# Patient Record
Sex: MALE | Race: WHITE | NOT HISPANIC OR LATINO | Employment: FULL TIME | URBAN - METROPOLITAN AREA
[De-identification: names, ages, dates, MRNs, and addresses within clinical notes are randomized per-mention and may not be internally consistent; named-entity substitution may affect disease eponyms.]

---

## 2018-05-09 ENCOUNTER — OFFICE VISIT (OUTPATIENT)
Dept: FAMILY MEDICINE | Facility: CLINIC | Age: 43
End: 2018-05-09
Payer: COMMERCIAL

## 2018-05-09 VITALS
TEMPERATURE: 97.2 F | WEIGHT: 193 LBS | HEIGHT: 68 IN | BODY MASS INDEX: 29.25 KG/M2 | HEART RATE: 73 BPM | SYSTOLIC BLOOD PRESSURE: 110 MMHG | DIASTOLIC BLOOD PRESSURE: 70 MMHG

## 2018-05-09 DIAGNOSIS — M54.50 ACUTE MIDLINE LOW BACK PAIN WITHOUT SCIATICA: Primary | ICD-10-CM

## 2018-05-09 PROCEDURE — 99213 OFFICE O/P EST LOW 20 MIN: CPT | Performed by: INTERNAL MEDICINE

## 2018-05-09 RX ORDER — CYCLOBENZAPRINE HCL 10 MG
10 TABLET ORAL 3 TIMES DAILY PRN
Qty: 60 TABLET | Refills: 1 | Status: SHIPPED | OUTPATIENT
Start: 2018-05-09 | End: 2018-06-12

## 2018-05-09 NOTE — MR AVS SNAPSHOT
After Visit Summary   5/9/2018    Derian English    MRN: [id number]           Patient Information     Date Of Birth          1975        Visit Information        Provider Department      5/9/2018 12:00 PM Dania Danielle [place] [facility] [place]'s Diagnoses     Acute midline low back pain without sciatica    -  1      Care Instructions      Back Pain (Acute or Chronic)    Back pain is one of the most common problems. The good news is that most people feel better in 1 to 2 weeks, and most of the rest in 1 to 2 months. Most people can remain active.  People experience and describe pain differently; not everyone is the same.    The pain can be sharp, stabbing, shooting, aching, cramping or burning.    Movement, standing, bending, lifting, sitting, or walking may worsen pain.    It can be localized to one spot or area, or it can be more generalized.    It can spread or radiate upwards, to the front, or go down your arms or legs (sciatica).    It can cause muscle spasm.  Most of the time, mechanical problems with the muscles or spine cause the pain. Mechanical problems are usually caused by an injury to the muscles or ligaments. While illness can cause back pain, it is usually not caused by a serious illness. Mechanical problems include:     Physical activity such as sports, exercise, work, or normal activity    Overexertion, lifting, pushing, pulling incorrectly or too aggressively    Sudden twisting, bending, or stretching from an accident, or accidental movement    Poor posture    Stretching or moving wrong, without noticing pain at the time    Poor coordination, lack of regular exercise (check with your doctor about this)    Spinal disc disease or arthritis    Stress  Pain can also be related to pregnancy, or illness like appendicitis, bladder or kidney infections, pelvic infections, and many other things.  Acute back pain usually gets better in 1 to 2 weeks. Back pain  related to disk disease, arthritis in the spinal joints or spinal stenosis (narrowing of the spinal canal) can become chronic and last for months or years.  Unless you had a physical injury (for example, a car accident or fall) X-rays are usually not needed for the initial evaluation of back pain. If pain continues and does not respond to medical treatment, X-rays and other tests may be needed.  Home care  Try these home care recommendations:    When in bed, try to find a position of comfort. A firm mattress is best. Try lying flat on your back with pillows under your knees. You can also try lying on your side with your knees bent up towards your chest and a pillow between your knees.    At first, do not try to stretch out the sore spots. If there is a strain, it is not like the good soreness you get after exercising without an injury. In this case, stretching may make it worse.    Avoid prolong sitting, long car rides, or travel. This puts more stress on the lower back than standing or walking.    During the first 24 to 72 hours after an acute injury or flare up of chronic back pain, apply an ice pack to the painful area for 20 minutes and then remove it for 20 minutes. Do this over a period of 60 to 90 minutes or several times a day. This will reduce swelling and pain. Wrap the ice pack in a thin towel or plastic to protect your skin.    You can start with ice, then switch to heat. Heat (hot shower, hot bath, or heating pad) reduces pain and works well for muscle spasms. Heat can be applied to the painful area for 20 minutes then remove it for 20 minutes. Do this over a period of 60 to 90 minutes or several times a day. Do not sleep on a heating pad. It can lead to skin burns or tissue damage.    You can alternate ice and heat therapy. Talk with your doctor about the best treatment for your back pain.    Therapeutic massage can help relax the back muscles without stretching them.    Be aware of safe lifting  methods and do not lift anything without stretching first.  Medicines  Talk to your doctor before using medicine, especially if you have other medical problems or are taking other medicines.    You may use over-the-counter medicine as directed on the bottle to control pain, unless another pain medicine was prescribed. If you have chronic conditions like diabetes, liver or kidney disease, stomach ulcers, or gastrointestinal bleeding, or are taking blood thinners, talk to your doctor before taking any medicine.    Be careful if you are given a prescription medicines, narcotics, or medicine for muscle spasms. They can cause drowsiness, affect your coordination, reflexes, and judgement. Do not drive or operate heavy machinery.  Follow-up care  Follow up with your healthcare provider, or as advised.   A radiologist will review any X-rays that were taken. Your provide will notify you of any new findings that may affect your care.  Call 911  Call emergency services if any of the following occur:    Trouble breathing    Confusion    Very drowsy or trouble awakening    Fainting or loss of consciousness    Rapid or very slow heart rate    Loss of bowel or bladder control  When to seek medical advice  Call your healthcare provider right away if any of these occur:     Pain becomes worse or spreads to your legs    Weakness or numbness in one or both legs    Numbness in the groin or genital area  Date Last Reviewed: 7/1/2016 2000-2017 [employer]. [address] [place] [address]. All rights reserved. This information is not intended as a substitute for professional medical care. Always follow your healthcare professional's instructions.                Follow-ups after your visit        Follow-up notes from your care team     Return in about 1 year (around 5/9/2019), or if symptoms worsen or fail to improve, for Physical Exam.      Who to contact     If you have questions or need follow up information  "about today's clinic visit or your schedule please contact [facility] directly at [phone].  Normal or non-critical lab and imaging results will be communicated to you by [employer], letter or phone within 4 business days after the clinic has received the results. If you do not hear from us within 7 days, please contact the clinic through [employer] or phone. If you have a critical or abnormal lab result, we will notify you by phone as soon as possible.  Submit refill requests through [email or web address] or call your pharmacy and they will forward the refill request to us. Please allow 3 business days for your refill to be completed.          Additional Information About Your Visit        [employer] Information     [email or web address] lets you send messages to your doctor, view your test results, renew your prescriptions, schedule appointments and more. To sign up, go to [email or web address] . Click on \"Log in\" on the left side of the screen, which will take you to the Welcome page. Then click on \"Sign up Now\" on the right side of the page.     You will be asked to enter the access code listed below, as well as some personal information. Please follow the directions to create your username and password.     Your access code is: [email or web address]  Expires: 2018 12:17 PM     Your access code will  in 90 days. If you need help or a new code, please call your [place] clinic or [phone].        Care EveryWhere ID     This is your Care EveryWhere ID. This could be used by other organizations to access your [place] medical records  [id number]        Your Vitals Were     Pulse Temperature Height BMI (Body Mass Index)          73 97.2  F (36.2  C) (Tympanic) 5' 8\" (1.727 m) 29.35 kg/m2         Blood Pressure from Last 3 Encounters:   18 110/70   16 112/68    Weight from Last 3 Encounters:   18 193 lb (87.5 kg)   16 187 lb (84.8 kg)              Today, you had the following     No orders found " for display         Today's Medication Changes          These changes are accurate as of 5/9/18 12:17 PM.  If you have any questions, ask your nurse or doctor.               Start taking these medicines.        Dose/Directions    cyclobenzaprine 10 MG tablet   Commonly known as:  FLEXERIL   Used for:  Acute midline low back pain without sciatica   Started by:  Dania Danielle MD        Dose:  10 mg   Take 1 tablet (10 mg) by mouth 3 times daily as needed for muscle spasms   Quantity:  60 tablet   Refills:  1            Where to get your medicines      These medications were sent to [id number] [place] [place] [place] - [address]  [address], [facility] [address]     Phone:  [phone]     cyclobenzaprine 10 MG tablet                Primary Care Provider Fax #    Provider Not In System [phone]                Equal Access to Services     SANA JAMES : Hadii didier aguiar hadasho Sospencer, waaxda luqadaha, qaybta kaalmada adeseverinoyada, kristopher lobo . So [facility] [phone].    ATENCIÓN: Si habla español, tiene a montero disposición servicios gratuitos de asistencia lingüística. Rl al [phone].    We comply with applicable federal civil rights laws and [place] laws. We do not discriminate on the basis of race, color, national origin, age, disability, sex, sexual orientation, or gender identity.            Thank you!     [facility] you [facility]  [facility] your [facility]. Our goal is always to provide you with excellent care. Hearing back from our patients is one way we can continue to improve our services. Please take a few minutes to complete the written survey that you may receive in the mail after your visit with us. Thank you!             Your Updated Medication List - Protect others around you: Learn how to safely use, store and throw away your medicines at www.disposemymeds.org.          This list is accurate as of 5/9/18 12:17 PM.  Always  use your most recent med list.                   Brand Name Dispense Instructions for use Diagnosis    cyclobenzaprine 10 MG tablet    FLEXERIL    60 tablet    Take 1 tablet (10 mg) by mouth 3 times daily as needed for muscle spasms    Acute midline low back pain without sciatica       IBUPROFEN PO      Take 800 mg by mouth every 6 hours as needed for moderate pain

## 2018-05-09 NOTE — PROGRESS NOTES
"  SUBJECTIVE:   Derian English is a 43 year old male who presents to clinic today for the following health issues:      Back Pain       Duration: yesterday am         Specific cause: bending over brushing his teth     Description:   Location of pain: low back bilateral  Character of pain: cramping  Pain radiation:radiates around to the front of the waist   New numbness or weakness in legs, not attributed to pain:  no     Intensity: Currently 2/10 but standing from sitting is worse     History:   Pain interferes with job: YES  History of back problems: no prior back problems  Any previous MRI or X-rays: None  Sees a specialist for back pain:  No  Therapies tried without relief: heat and NSAIDs    Alleviating factors:   Improved by: walking around       Precipitating factors:  Worsened by: sitting still or lying down and then trying to get back up     Functional and Psychosocial Screen (Jose STarT Back):          Derian is here accompanied by his wife for acute low back pain.  It started yesterday morning while he was leaning over the skin brushing his teeth.  He had acute onset of lower back pain, felt frozen in place.  He did remove the stump from his yard in the past week.  He has been taking ibuprofen 800 mg, trying some gentle stretching and taking NEC.  The pain is much better today.  He has not had back pain issues in the past.  He denies any radiating symptoms.  He works as a , out walking on the floor quite a bit.          Reviewed and updated as needed this visit by clinical staff  Tobacco  Allergies  Meds  Soc Hx      Reviewed and updated as needed this visit by Provider         ROS:  Musculoskeletal, neuro reviewed,  otherwise negative unless noted above.       OBJECTIVE:     /70 (BP Location: Left arm, Patient Position: Chair, Cuff Size: Adult Large)  Pulse 73  Temp 97.2  F (36.2  C) (Tympanic)  Ht 5' 8\" (1.727 m)  Wt 193 lb (87.5 kg)  BMI 29.35 kg/m2  Body mass index " is 29.35 kg/(m^2).    Gen: pleasant, well appearing man, moving stiffly, no distress  MSK: no spinal, SI joint, or lumbar paraspinal muscle tenderness  Neuro: full strength in LE's b/l. 2+ patellar reflexes b/l. Sensation intact to LT. Normal gait.       Diagnostic Test Results:  none     ASSESSMENT/PLAN:         1. Acute midline low back pain without sciatica  Acute back spasm.  Recommended ATC NSAIDs for a few days, muscle relaxant as needed, ice and/or heat, gentle activity and ROM.  Consider PT if not making good progress.   - cyclobenzaprine (FLEXERIL) 10 MG tablet; Take 1 tablet (10 mg) by mouth 3 times daily as needed for muscle spasms  Dispense: 60 tablet; Refill: 1    F/U as needed for persistent or worsening symptoms.       Dania Danielle MD  [facility] [place]

## 2018-05-09 NOTE — PATIENT INSTRUCTIONS

## 2018-06-12 ENCOUNTER — OFFICE VISIT (OUTPATIENT)
Dept: FAMILY MEDICINE | Facility: CLINIC | Age: 43
End: 2018-06-12
Payer: COMMERCIAL

## 2018-06-12 VITALS
OXYGEN SATURATION: 98 % | SYSTOLIC BLOOD PRESSURE: 123 MMHG | WEIGHT: 190.3 LBS | HEIGHT: 68 IN | DIASTOLIC BLOOD PRESSURE: 88 MMHG | HEART RATE: 69 BPM | BODY MASS INDEX: 28.84 KG/M2

## 2018-06-12 DIAGNOSIS — Z30.09 ENCOUNTER FOR VASECTOMY ASSESSMENT: Primary | ICD-10-CM

## 2018-06-12 DIAGNOSIS — H93.11 TINNITUS, RIGHT: ICD-10-CM

## 2018-06-12 PROCEDURE — 99214 OFFICE O/P EST MOD 30 MIN: CPT | Performed by: FAMILY MEDICINE

## 2018-06-12 RX ORDER — INFLUENZA VIRUS VACCINE 15; 15; 15; 15 UG/.5ML; UG/.5ML; UG/.5ML; UG/.5ML
SUSPENSION INTRAMUSCULAR
COMMUNITY
Start: 2018-01-16 | End: 2018-06-12

## 2018-06-12 RX ORDER — AMOXICILLIN 875 MG
TABLET ORAL
COMMUNITY
Start: 2018-05-30 | End: 2018-06-12

## 2018-06-12 RX ORDER — NEOMYCIN SULFATE, POLYMYXIN B SULFATE, HYDROCORTISONE 3.5; 10000; 1 MG/ML; [USP'U]/ML; MG/ML
SOLUTION/ DROPS AURICULAR (OTIC)
COMMUNITY
Start: 2018-05-30 | End: 2018-06-12

## 2018-06-12 NOTE — MR AVS SNAPSHOT
After Visit Summary   6/12/2018    Derian English    MRN: [id number]           Patient Information     Date Of Birth          1975        Visit Information        Provider Department      6/12/2018 9:40 AM Anton Randall Jr., MD [facility]        Today's Diagnoses     Encounter for vasectomy assessment    -  1    Tinnitus, right          Care Instructions      Understanding Vasectomy  Vasectomy is a simple, safe procedure that makes a man sterile (unable to father a child). It is the most effective birth control method for men.  Your reproductive system  For pregnancy to occur, a man s sperm (male reproductive cells) must join with a woman s egg. To understand how a vasectomy works, you need to know how sperm are produced, stored, and released by the body:    The urethra is the tube in the center of the penis. It transports both urine and semen. When you have an orgasm, semen is ejaculated out of the urethra.    The seminal vesicles and the prostate gland secrete fluids called semen. This sticky, white fluid helps nourish sperm and carry them along.    The epididymis is a coiled tube that holds the sperm while they mature.    The scrotum is a pouch of skin that contains the testes.    The testes are glands that produce sperm and male hormones.    The vas deferens are tubes that carry the sperm from the epididymis to the penis.    Sperm (shown magnified) carry genetic material.     How a vasectomy works  During the procedure, the 2 vas deferens are cut and sealed off. This prevents sperm from traveling from the testes to the penis. It is the only change in your reproductive system. The testes still produce sperm. But since the sperm have nowhere to go, they die and are absorbed by your body. Only a very small amount of semen is made up of sperm. So after a vasectomy, your semen won t look or feel any different.  Keep in mind  After a vasectomy, some active sperm still remain in  the reproductive system. It will take about 3 months and numerous ejaculations before the semen is completely free of sperm. Until then, you ll need to use another form of birth control.   Date Last Reviewed: 1/1/2017 2000-2017 [employer]. [address] [place] [id number]. All rights reserved. This information is not intended as a substitute for professional medical care. Always follow your healthcare professional's instructions.        Having a Vasectomy: Before, During, and After the Procedure     The cut ends of the vas may be tied, closed with a clip, or sealed by heat (cauterized).   Vasectomy is an outpatient procedure. This means you can go home the same day. It can be done in a doctor s office, clinic, or hospital. Your doctor will talk with you about how to get ready for surgery. He or she will also discuss the possible risks and complications with you. After the procedure, follow your doctor s advice for recovery.  Getting ready for surgery  Your doctor will talk with you about getting ready for surgery. You may be asked to do the following:    Sign a consent form. This must be done at least a few days before surgery. It gives your doctor permission to do the procedure. It also states that a vasectomy is not guaranteed to make you sterile.    Don t take aspirin, ibuprofen, or naproxen for 2 weeks before surgery. These medicines can cause bleeding after the procedure. Also, tell your doctor if you take any medicines, supplements, or herbal remedies.    Tell your doctor if you ve had any scrotal surgery in the past.    Arrange for an adult family member or friend to give you a ride home after surgery.    Shower and clean your scrotum the day of surgery. Your doctor may also ask you to shave your scrotum.    Bring a jock strap (athletic supporter) or pair of snug cotton briefs to the doctor s office or hospital.    Eat no more than a light snack before surgery.  During surgery  The  entire procedure usually lasts less than 30 minutes.    You ll be asked to undress and lie on a table.    You may be given medicine to help you relax. To prevent pain during surgery, you ll be given an injection of pain medicine in your scrotum or lower groin.    Once the area is numb, the doctor makes one or two small incisions in the scrotum. This may be done with a scalpel or with a pointed clamp (no-scalpel method).    The vas deferens are lifted through the incision and cut. The provider seals off the ends of the vas deferens using one of several methods.    If needed, the incision is closed with stitches.    You can rest for a while until you re ready to go home.  Recovering at home  For about a week, your scrotum may look bruised and slightly swollen. You may also have a small amount of bloody discharge from the incision. This is normal.  To help make your recovery more comfortable, follow the tips below.    Stay off your feet as much as possible for the first 2 days. Try to lie flat on a bed or sofa.    Wear an athletic supporter or snug cotton briefs for support.    Reduce swelling by using an ice pack or bag of frozen peas wrapped in a thin towel. Put the ice pack or towel on your scrotum.    Take medicines with acetaminophen to relieve any discomfort. Don t use aspirin, ibuprofen, or naproxen.    Wait 48 hours before bathing.    Avoid heavy lifting or exercise for 7 days.    Ask your doctor how long to wait before having sex again. Remember: You must use another form of birth control until you re completely sterile.  When to seek medical care  Call your doctor if you notice any of the following after surgery:    Increasing pain or swelling in your scrotum    A large black-and-blue area, or a growing lump    Fever or chills    Increasing redness or drainage of the incision    Trouble urinating   Sex after vasectomy  Vasectomy doesn t change your sexual function. So when you start having sex again, it  should feel the same as before. A vasectomy also shouldn t affect your relationship with your partner. It s important to remember, though, that you won t become sterile right away. It will take time before you can have sex without the need for birth control.    Until you re sterile: After a vasectomy, some active sperm still remain in your semen. It will take time and many ejaculations before the sperm are completely gone. During this period, you must use another birth control method to prevent pregnancy. To make sure no sperm are left in your semen, you ll need to have one or more semen exams. You usually collect a semen sample at home and bring it to a lab. The sample is then checked under a microscope. You re sterile only when these samples show no evidence of sperm. Ask your doctor whether additional follow-up is needed.    After you re sterile: After your doctor tells you you re sterile, you no longer need to use any form of birth control. You re free to have sex without the fear of unwanted pregnancy. But a vasectomy does not protect you from sexually transmitted diseases (STDs). If you have more than one sex partner, be sure to practice safer sex by using condoms.  Date Last Reviewed: 1/1/2017 2000-2017 [employer] [address] [place] [address]. All rights reserved. This information is not intended as a substitute for professional medical care. Always follow your healthcare professional's instructions.        Vasectomy: Risks and Complications  A vasectomy is an outpatient procedure. This means you ll go home the same day. It s done in a doctor s office, clinic, or hospital. Before your procedure, you ll be asked to read and sign a consent form. This form states you re aware of the possible risks and complications. It also says that you understand that the procedure, though most often successful, can t promise to make you sterile. Be sure you have all your questions answered  before you sign this form. Below is a list of risks and possible complications of the procedure.  Risks and possible complications of vasectomy  Vasectomy is safe. But it does have risks. They include the following:    Bleeding or infection    Sperm granuloma. This is a small, harmless lump. It may form where the vas deferens is sealed off.    Sperm buildup (congestion). This may cause soreness in the testes. Anti-inflammatory medicine can provide relief.    Epididymitis. This is inflammation that may cause scrotal aching. It often goes away without treatment. Anti-inflammatory medicine can provide relief.    Reconnection of the vas deferens. This can occur in rare cases. It makes you fertile again. This may result in an unplanned pregnancy.    Sperm antibodies. Developing antibodies is a common response of your body to the absorbed sperm. The antibodies can make you sterile. This is true even if you later try to reverse your vasectomy.    Long-term testicular discomfort. This may occur after surgery. But it s very rare.   Date Last Reviewed: 1/1/2017 2000-2017 [employer]. [address] [place] [address]. All rights reserved. This information is not intended as a substitute for professional medical care. Always follow your healthcare professional's instructions.                Follow-ups after your visit        Who to contact     If you have questions or need follow up information about today's clinic visit or your schedule please contact [facility] directly at [phone].  Normal or non-critical lab and imaging results will be communicated to you by MyChart, letter or phone within 4 business days after the clinic has received the results. If you do not hear from us within 7 days, please contact the clinic through MyChart or phone. If you have a critical or abnormal lab result, we will notify you by phone as soon as possible.  Submit refill requests through [employer] or call  "your pharmacy and they will forward the refill request to us. Please allow 3 business days for your refill to be completed.          Additional Information About Your Visit        MyChart Information     uControl lets you send messages to your doctor, view your test results, renew your prescriptions, schedule appointments and more. To sign up, go to [email or web address] . Click on \"Log in\" on the left side of the screen, which will take you to the Welcome page. Then click on \"Sign up Now\" on the right side of the page.     You will be asked to enter the access code listed below, as well as some personal information. Please follow the directions to create your username and password.     Your access code is: [email or web address]  Expires: 9/10/2018  9:22 AM     Your access code will  in 90 days. If you need help or a new code, please call your [facility] clinic or [phone].        Care EveryWhere ID     This is your Care EveryWhere ID. This could be used by other organizations to access your [facility] medical records  [id number]        Your Vitals Were     Pulse Height Pulse Oximetry BMI (Body Mass Index)          69 5' 8\" (1.727 m) 98% 28.94 kg/m2         Blood Pressure from Last 3 Encounters:   18 123/88   18 110/70   16 112/68    Weight from Last 3 Encounters:   18 190 lb 4.8 oz (86.3 kg)   18 193 lb (87.5 kg)   16 187 lb (84.8 kg)              Today, you had the following     No orders found for display       Primary Care Provider Fax #    Provider Not In System [phone]                Equal Access to Services     [facility]: Hadii didier Buckley, mickey currie, qacarmelo mcgillalariel chapman, kristopher lobo . So [facility] [phone].    ATENCIÓN: Si habla español, tiene a montero disposición servicios gratuitos de asistencia lingüística. Llame al [phone].    We comply with applicable federal civil rights laws and [place] laws. We do not " discriminate on the basis of race, color, national origin, age, disability, sex, sexual orientation, or gender identity.            Thank you!     Thank you for choosing [facility]  for your care. Our goal is always to provide you with excellent care. Hearing back from our patients is one way we can continue to improve our services. Please take a few minutes to complete the written survey that you may receive in the mail after your visit with us. Thank you!             Your Updated Medication List - Protect others around you: Learn how to safely use, store and throw away your medicines at www.disposemymeds.org.          This list is accurate as of 6/12/18  9:57 AM.  Always use your most recent med list.                   Brand Name Dispense Instructions for use Diagnosis    amoxicillin 875 MG tablet    AMOXIL          IBUPROFEN PO      Take 800 mg by mouth every 6 hours as needed for moderate pain        neomycin-polymyxin-HC 1 % Soln

## 2018-06-12 NOTE — PROGRESS NOTES
Indication: Vasectomy Consultation    S:  This patient has presented for discussion of vasectomy.  He and his wife have agreed they are done having children and desire permanent sterilization for him. The procedure was explained in detail using diagrams published by [employer].  The permanency and effectiveness of this operation were explained in detail.  Complications were also explained in detail, including vasectomy failure rate, bleeding, infection, scarring, chronic pain, and epididymitis.      The patient was told to shave his scrotal area the day before the procedure to prep for surgery.  He was also told he'd need to remain inactive for 48-72 hours afterwards, no lifting more than 20 lbs or sexual intercourse for two weeks.  The patient was also told that he should have a post-operative sperm sample checked and should refrain from unprotected sexual intercourse until the sperm sample shows no sperm.      Approximately 20 minutes were used in the discussion of the vasectomy and questions were answered.    Also notes he's had right sided tinnitus for the past 3 weeks or so.  Spontaneous in onset. Admits to having worked in a loud environment in the past.  Was seen in [facility] and placed on amoxicillin and ear drops without resolution of his symptoms.     O:  Vitals as below.   Gen: pleasant 44 yo male in no apparent distress. On examination, the vas deferens were found bilaterally.  TM's are clear bilaterally with normal movement with Valsalva maneuver.  Neck is supple without lymphadenopathy.     A: Vasectomy consultation, right tinnitus.    P: Schedule vasectomy.  Patient was offered pre-operative Valium which he declined.  Discussed formal hearing evaluation, possible ENT referral for his tinnitus.  Reassured him that I do not think there is an infection presently.  He'd prefer to get his vasectomy done first and then proceed with evaluating his tinnitus at a later time.  Over 25 minutes  spent with patient today, greater than 50% in face to face counseling.     Anton Randall MD

## 2018-06-12 NOTE — PATIENT INSTRUCTIONS
Understanding Vasectomy  Vasectomy is a simple, safe procedure that makes a man sterile (unable to father a child). It is the most effective birth control method for men.  Your reproductive system  For pregnancy to occur, a man s sperm (male reproductive cells) must join with a woman s egg. To understand how a vasectomy works, you need to know how sperm are produced, stored, and released by the body:    The urethra is the tube in the center of the penis. It transports both urine and semen. When you have an orgasm, semen is ejaculated out of the urethra.    The seminal vesicles and the prostate gland secrete fluids called semen. This sticky, white fluid helps nourish sperm and carry them along.    The epididymis is a coiled tube that holds the sperm while they mature.    The scrotum is a pouch of skin that contains the testes.    The testes are glands that produce sperm and male hormones.    The vas deferens are tubes that carry the sperm from the epididymis to the penis.    Sperm (shown magnified) carry genetic material.     How a vasectomy works  During the procedure, the 2 vas deferens are cut and sealed off. This prevents sperm from traveling from the testes to the penis. It is the only change in your reproductive system. The testes still produce sperm. But since the sperm have nowhere to go, they die and are absorbed by your body. Only a very small amount of semen is made up of sperm. So after a vasectomy, your semen won t look or feel any different.  Keep in mind  After a vasectomy, some active sperm still remain in the reproductive system. It will take about 3 months and numerous ejaculations before the semen is completely free of sperm. Until then, you ll need to use another form of birth control.   Date Last Reviewed: 1/1/2017 2000-2017 [employer]. [address] [place] [address], [place] [address]. All rights reserved. This information is not intended as a substitute for professional medical  care. Always follow your healthcare professional's instructions.        Having a Vasectomy: Before, During, and After the Procedure     The cut ends of the vas may be tied, closed with a clip, or sealed by heat (cauterized).   Vasectomy is an outpatient procedure. This means you can go home the same day. It can be done in a doctor s office, clinic, or hospital. Your doctor will talk with you about how to get ready for surgery. He or she will also discuss the possible risks and complications with you. After the procedure, follow your doctor s advice for recovery.  Getting ready for surgery  Your doctor will talk with you about getting ready for surgery. You may be asked to do the following:    Sign a consent form. This must be done at least a few days before surgery. It gives your doctor permission to do the procedure. It also states that a vasectomy is not guaranteed to make you sterile.    Don t take aspirin, ibuprofen, or naproxen for 2 weeks before surgery. These medicines can cause bleeding after the procedure. Also, tell your doctor if you take any medicines, supplements, or herbal remedies.    Tell your doctor if you ve had any scrotal surgery in the past.    Arrange for an adult family member or friend to give you a ride home after surgery.    Shower and clean your scrotum the day of surgery. Your doctor may also ask you to shave your scrotum.    Bring a jock strap (athletic supporter) or pair of snug cotton briefs to the doctor s office or hospital.    Eat no more than a light snack before surgery.  During surgery  The entire procedure usually lasts less than 30 minutes.    You ll be asked to undress and lie on a table.    You may be given medicine to help you relax. To prevent pain during surgery, you ll be given an injection of pain medicine in your scrotum or lower groin.    Once the area is numb, the doctor makes one or two small incisions in the scrotum. This may be done with a scalpel or with a pointed  clamp (no-scalpel method).    The vas deferens are lifted through the incision and cut. The provider seals off the ends of the vas deferens using one of several methods.    If needed, the incision is closed with stitches.    You can rest for a while until you re ready to go home.  Recovering at home  For about a week, your scrotum may look bruised and slightly swollen. You may also have a small amount of bloody discharge from the incision. This is normal.  To help make your recovery more comfortable, follow the tips below.    Stay off your feet as much as possible for the first 2 days. Try to lie flat on a bed or sofa.    Wear an athletic supporter or snug cotton briefs for support.    Reduce swelling by using an ice pack or bag of frozen peas wrapped in a thin towel. Put the ice pack or towel on your scrotum.    Take medicines with acetaminophen to relieve any discomfort. Don t use aspirin, ibuprofen, or naproxen.    Wait 48 hours before bathing.    Avoid heavy lifting or exercise for 7 days.    Ask your doctor how long to wait before having sex again. Remember: You must use another form of birth control until you re completely sterile.  When to seek medical care  Call your doctor if you notice any of the following after surgery:    Increasing pain or swelling in your scrotum    A large black-and-blue area, or a growing lump    Fever or chills    Increasing redness or drainage of the incision    Trouble urinating   Sex after vasectomy  Vasectomy doesn t change your sexual function. So when you start having sex again, it should feel the same as before. A vasectomy also shouldn t affect your relationship with your partner. It s important to remember, though, that you won t become sterile right away. It will take time before you can have sex without the need for birth control.    Until you re sterile: After a vasectomy, some active sperm still remain in your semen. It will take time and many ejaculations before the  sperm are completely gone. During this period, you must use another birth control method to prevent pregnancy. To make sure no sperm are left in your semen, you ll need to have one or more semen exams. You usually collect a semen sample at home and bring it to a lab. The sample is then checked under a microscope. You re sterile only when these samples show no evidence of sperm. Ask your doctor whether additional follow-up is needed.    After you re sterile: After your doctor tells you you re sterile, you no longer need to use any form of birth control. You re free to have sex without the fear of unwanted pregnancy. But a vasectomy does not protect you from sexually transmitted diseases (STDs). If you have more than one sex partner, be sure to practice safer sex by using condoms.  Date Last Reviewed: 1/1/2017 2000-2017 [employer] [address] [place] [address]. All rights reserved. This information is not intended as a substitute for professional medical care. Always follow your healthcare professional's instructions.        Vasectomy: Risks and Complications  A vasectomy is an outpatient procedure. This means you ll go home the same day. It s done in a doctor s office, clinic, or hospital. Before your procedure, you ll be asked to read and sign a consent form. This form states you re aware of the possible risks and complications. It also says that you understand that the procedure, though most often successful, can t promise to make you sterile. Be sure you have all your questions answered before you sign this form. Below is a list of risks and possible complications of the procedure.  Risks and possible complications of vasectomy  Vasectomy is safe. But it does have risks. They include the following:    Bleeding or infection    Sperm granuloma. This is a small, harmless lump. It may form where the vas deferens is sealed off.    Sperm buildup (congestion). This may cause soreness in the  testes. Anti-inflammatory medicine can provide relief.    Epididymitis. This is inflammation that may cause scrotal aching. It often goes away without treatment. Anti-inflammatory medicine can provide relief.    Reconnection of the vas deferens. This can occur in rare cases. It makes you fertile again. This may result in an unplanned pregnancy.    Sperm antibodies. Developing antibodies is a common response of your body to the absorbed sperm. The antibodies can make you sterile. This is true even if you later try to reverse your vasectomy.    Long-term testicular discomfort. This may occur after surgery. But it s very rare.   Date Last Reviewed: 1/1/2017 2000-2017 [employer]. [address] [place] [address], [place] [address]. All rights reserved. This information is not intended as a substitute for professional medical care. Always follow your healthcare professional's instructions.

## 2018-06-12 NOTE — PROGRESS NOTES
SUBJECTIVE:   Derian English is a 43 year old male who presents to clinic today for the following health issues:    Patient request : Would like to get a vasectomy and would like to discuss with provider     Concern -  Ear problem   Onset: x 1 week     Description:   Location - right ear - constant high pitch ringing     Intensity: mild    Progression of Symptoms:  same    Accompanying Signs & Symptoms:  Denies headaches -fever- chills - nausea     Previous history of similar problem:   No     Precipitating factors:   Worsened by:     Alleviating factors:  Improved by:     Therapies Tried and outcome: Was [facility] amox[facility] - shows mild improvement

## 2018-06-27 ENCOUNTER — OFFICE VISIT (OUTPATIENT)
Dept: FAMILY MEDICINE | Facility: CLINIC | Age: 43
End: 2018-06-27
Payer: COMMERCIAL

## 2018-06-27 VITALS
BODY MASS INDEX: 28.48 KG/M2 | HEART RATE: 67 BPM | OXYGEN SATURATION: 99 % | SYSTOLIC BLOOD PRESSURE: 127 MMHG | WEIGHT: 187.3 LBS | TEMPERATURE: 98.5 F | DIASTOLIC BLOOD PRESSURE: 79 MMHG

## 2018-06-27 DIAGNOSIS — Z30.2 ENCOUNTER FOR VASECTOMY: Primary | ICD-10-CM

## 2018-06-27 PROCEDURE — 88302 TISSUE EXAM BY PATHOLOGIST: CPT | Performed by: FAMILY MEDICINE

## 2018-06-27 PROCEDURE — 55250 REMOVAL OF SPERM DUCT(S): CPT | Performed by: FAMILY MEDICINE

## 2018-06-27 NOTE — PROGRESS NOTES
The patient comes in today for a vasectomy.  The patient and his significant other have previously been in and we discussed the other options for birth control, the risks and benefits of the procedure.  Details of those risks and benefits have been noted in the consent form which has been signed.  This includes the potential for bleeding, potential for infection.      The patient was placed in a supine position on the procedure table.  Scrotal shaving was done by the patient at home.  Sterilely prepped and draped in the usual fashion.  The right vas was first identified and brought up to the midline raphae where a small wheal of Lidocaine without epinephrine was placed in the skin overlying the vas and further anesthesia was injected in to the vas sheath.  The vas was then secured through the scrotal skin with a towel clamp.  A #15 scalpel was then used to incise the skin and then I dissected out the vas free of adventitial tissue.   When a segment of vas was clearly freed up, two titanium surgical clips were placed on the proximal and one on the distal end of a .5-1 cm. segment of the vas.  The vas material between the clips was then cut and removed.  Each ligated end was then cauterized.  When the sterile field was completely dry, the vas was returned to the scrotal sac.      Attention was then turned to the opposite side and proceeded in similar fashion to obtain specimen.  The vas was brought up to the same opening, injected with Lidocaine along the vas sheath and grasped with the clamp, and proceeded in the above fashion.  A 4-0 Dexon suture was used for the scrotal skin incision and a single interrupted suture was used to re-approximate the skin edges.    ASSESSMENT:  1)  Male sterilization via vasectomy.     PLAN:  Patient tolerated the procedure quite well.  He will use Tylenol 1000 mg TID with food.  Ice to the scrotum 20 minutes at a time every two to three hours while awake for the next two to three days.   No heavy lifting or intercourse for 14 days.  The patient will bring in a sample of semen for analysis after 3 months.    Anton Randall MD

## 2018-06-27 NOTE — MR AVS SNAPSHOT
"              After Visit Summary   2018    Derian English    MRN: [id number]           Patient Information     Date Of Birth          1975        Visit Information        Provider Department      2018 2:20 PM Anton Randall Jr., MD; [facility]        Today's Diagnoses     Encounter for vasectomy    -  1       Follow-ups after your visit        Follow-up notes from your care team     Return in about 3 months (around 2018) for Lab Work.      Who to contact     If you have questions or need follow up information about today's clinic visit or your schedule please contact [facility] directly at [phone].  Normal or non-critical lab and imaging results will be communicated to you by MyChart, letter or phone within 4 business days after the clinic has received the results. If you do not hear from us within 7 days, please contact the clinic through MyChart or phone. If you have a critical or abnormal lab result, we will notify you by phone as soon as possible.  Submit refill requests through [employer] or call your pharmacy and they will forward the refill request to us. Please allow 3 business days for your refill to be completed.          Additional Information About Your Visit        MyChart Information     Splunk lets you send messages to your doctor, view your test results, renew your prescriptions, schedule appointments and more. To sign up, go to [email or web address] . Click on \"Log in\" on the left side of the screen, which will take you to the Welcome page. Then click on \"Sign up Now\" on the right side of the page.     You will be asked to enter the access code listed below, as well as some personal information. Please follow the directions to create your username and password.     Your access code is: [email or web address]  Expires: 9/10/2018  9:22 AM     Your access code will  in 90 days. If you need help or a new code, please call your [place] " clinic or [phone].        Care EveryWhere ID     This is your Care EveryWhere ID. This could be used by other organizations to access your [place] medical records  [id number]        Your Vitals Were     Pulse Temperature Pulse Oximetry BMI (Body Mass Index)          67 98.5  F (36.9  C) (Oral) 99% 28.48 kg/m2         Blood Pressure from Last 3 Encounters:   06/27/18 127/79   06/12/18 123/88   05/09/18 110/70    Weight from Last 3 Encounters:   06/27/18 187 lb 4.8 oz (85 kg)   06/12/18 190 lb 4.8 oz (86.3 kg)   05/09/18 193 lb (87.5 kg)              Today, you had the following     No orders found for display       Primary Care Provider Fax #    Provider Not In System [phone]                Equal Access to Services     ANTONY JAMES : Hadpreston Buckley, mickey currie, joe kaalariel chapman, kristopher lobo . So [facility] [phone].    ATENCIÓN: Si habla español, tiene a montero disposición servicios gratuitos de asistencia lingüística. Rl al [phone].    We comply with applicable federal civil rights laws and [place] laws. We do not discriminate on the basis of race, color, national origin, age, disability, sex, sexual orientation, or gender identity.            Thank you!     Thank you for choosing [facility]  for your care. Our goal is always to provide you with excellent care. Hearing back from our patients is one way we can continue to improve our services. Please take a few minutes to complete the written survey that you may receive in the mail after your visit with us. Thank you!             Your Updated Medication List - Protect others around you: Learn how to safely use, store and throw away your medicines at www.disposemymeds.org.      Notice  As of 6/27/2018  2:55 PM    You have not been prescribed any medications.

## 2018-06-27 NOTE — PROGRESS NOTES
"  SUBJECTIVE:   Derian English is a 43 year old male who presents to clinic today for the following health issues:      Pt here today for Vasectomy procedure.     {Superlists:366329}    {additional problems for provider to add:095357}    Problem list and histories reviewed & adjusted, as indicated.  Additional history: {NONE - AS DOCUMENTED:675595::\"as documented\"}    {HIST REVIEW/ LINKS 2:794036}    Reviewed and updated as needed this visit by clinical staff       Reviewed and updated as needed this visit by Provider         {PROVIDER CHARTING PREFERENCE:144820}  "

## 2018-06-29 LAB — COPATH REPORT: NORMAL

## 2019-06-28 ENCOUNTER — ANCILLARY PROCEDURE (OUTPATIENT)
Dept: GENERAL RADIOLOGY | Facility: CLINIC | Age: 44
End: 2019-06-28
Attending: NURSE PRACTITIONER
Payer: COMMERCIAL

## 2019-06-28 ENCOUNTER — OFFICE VISIT (OUTPATIENT)
Dept: FAMILY MEDICINE | Facility: CLINIC | Age: 44
End: 2019-06-28
Payer: COMMERCIAL

## 2019-06-28 VITALS
BODY MASS INDEX: 27.13 KG/M2 | DIASTOLIC BLOOD PRESSURE: 62 MMHG | HEIGHT: 68 IN | HEART RATE: 48 BPM | SYSTOLIC BLOOD PRESSURE: 100 MMHG | OXYGEN SATURATION: 99 % | WEIGHT: 179 LBS | TEMPERATURE: 96.7 F

## 2019-06-28 DIAGNOSIS — S99.922A FOOT INJURY, LEFT, INITIAL ENCOUNTER: ICD-10-CM

## 2019-06-28 DIAGNOSIS — S99.922A FOOT INJURY, LEFT, INITIAL ENCOUNTER: Primary | ICD-10-CM

## 2019-06-28 PROCEDURE — 99213 OFFICE O/P EST LOW 20 MIN: CPT | Performed by: NURSE PRACTITIONER

## 2019-06-28 PROCEDURE — 73630 X-RAY EXAM OF FOOT: CPT | Mod: LT

## 2019-06-28 ASSESSMENT — MIFFLIN-ST. JEOR: SCORE: 1681.44

## 2019-06-28 NOTE — PROGRESS NOTES
Subjective     Derian English is a 44 year old male who presents to clinic today for the following health issues:    HPI   Joint Pain    Onset: Sunday     Description:   Location: left foot, second toe   Character: throbbing     Intensity: moderate    Progression of Symptoms: worse    Accompanying Signs & Symptoms:  Other symptoms: cut it, is scabbing     History:   Previous similar pain: no       Precipitating factors:   Trauma or overuse: YES- baby gate fell on the foot and he stumbled onto it.     Alleviating factors:  Improved by: nothing    Therapies Tried and outcome: advil - helpful     HPI: Derian presents today with the complaint of left foot / toe pain after injury. Last Sunday, he was carrying laundry upstairs and used his left foot to kick open a baby gate. He feels like he struck the gate with the dorsal aspect of his left great toe MTP joint and just proximal. The gate fell over and he also got the underside of his second toe (also left foot) caught. This caused a skin tear over the plantar aspect of that toe (between MTP joint and PIP joint). This wound has been healing uneventfully with no sign of complication / infection, but his great toe and just proximal to his great toe (along first metatarsal) have been getting progressively more painful. He can bear weight, but it is painful to walk. It is also tender to the touch, slightly swollen, red, and warm. He denies fever, chills, and other constitutional symptoms. His first attempt at home care / OTC remedies was this morning when he took Advil for the first time. He states that this helped minimally.     Patient Active Problem List   Diagnosis     CARDIOVASCULAR SCREENING; LDL GOAL LESS THAN 160     Overweight (BMI 25.0-29.9)     Pruritus ani     Past Surgical History:   Procedure Laterality Date     VASECTOMY Bilateral 06/27/2018    Dr. Anton Randall       Social History     Tobacco Use     Smoking status: Never Smoker     Smokeless tobacco:  "Never Used   Substance Use Topics     Alcohol use: Yes     Alcohol/week: 0.0 oz     Comment: 10/week     Family History   Problem Relation Age of Onset     Other Cancer Mother 47        lung; lobectomy, no issue since.      Family History Negative Father      Coronary Artery Disease No family hx of      Diabetes No family hx of      Colon Cancer No family hx of      Prostate Cancer No family hx of            Reviewed and updated as needed this visit by Provider  Tobacco  Allergies  Meds  Problems  Med Hx  Surg Hx  Fam Hx         Review of Systems   ROS COMP: Constitutional, musculoskeletal, neuro, skin systems are negative, except as otherwise noted.      Objective    /62 (BP Location: Left arm, Patient Position: Chair, Cuff Size: Adult Regular)   Pulse (!) 48   Temp 96.7  F (35.9  C) (Tympanic)   Ht 1.727 m (5' 8\")   Wt 81.2 kg (179 lb)   SpO2 99%   BMI 27.22 kg/m    Body mass index is 27.22 kg/m .  Physical Exam   GENERAL: healthy, alert and no distress  MS: First metatarsal and first MTP joint (dorsal aspect) - mild erythema, edema, and warmth noted. ROM intact though painful. Gait normal. Weight bearing. No evidence of gross deformity. Underside of 2nd toe with healing skin tear (no evidence of infection or other complication).  SKIN: Healing, uncomplicated skin tear (pinch injury) on underside of 2nd toe.  NEURO: Normal strength and tone, mentation intact and speech normal    Diagnostic Test Results:  Xray - Personally-reviewed with patient in exam room. No evidence of acute fracture or dislocation.        Assessment & Plan     Derian was seen today for musculoskeletal problem. No evidence of fracture or dislocation on x-ray. Likely a traumatic injury to soft and/or connective tissues adjacent to first metatarsal and first MTP joint. Will treat conservatively, initially. Ice 4 times a day. Use ibuprofen liberally for a week. Elevate when possible. Wear stiff-soled shoes. Watch for evidence of " "infection / complication associate with skin wound on underside of 2nd toe. Will follow up in 3-4 weeks if no improvement is noted. Agrees with plan, and all questions answered.     Diagnoses and all orders for this visit:    Foot injury, left, initial encounter  -     XR Foot Left G/E 3 Views; Future       BMI:   Estimated body mass index is 27.22 kg/m  as calculated from the following:    Height as of this encounter: 1.727 m (5' 8\").    Weight as of this encounter: 81.2 kg (179 lb).   Weight management plan: Discussed healthy diet and exercise guidelines      See Patient Instructions    Return in about 3 weeks (around 7/19/2019) for persistent or worsening symptoms.    Taqueria Krueger NP  [facility]      "

## 2019-06-28 NOTE — PATIENT INSTRUCTIONS
Ice 3-4 times a day for 15 minutes each time.    Elevate when possible.    Use ibuprofen (Advil) liberally for a week (600 mg three times a day). Take this with food.

## 2019-07-17 DIAGNOSIS — Z98.52 STATUS POST VASECTOMY: Primary | ICD-10-CM

## 2019-07-17 LAB — SEMEN ANALYSIS P VAS PNL: NORMAL

## 2019-07-17 PROCEDURE — 89321 SEMEN ANAL SPERM DETECTION: CPT | Performed by: FAMILY MEDICINE

## 2019-07-17 NOTE — LETTER
[facility]                     ( [phone]   July 18, 2019    Derian English  [address] [address]      Dear Derian,    Here is a summary of your recent test results:    All of your labs are normal.  You're good to go!     Your test results are enclosed.      Please contact me if you have any questions.             Thank you very much for trusting [facility].     Healthy regards,  Anton Randall Jr, MD          Results for orders placed or performed in visit on 07/17/19   Semen Analysis Post Vasectomy   Result Value Ref Range    Post Vas Analysis No Sperm Seen NOSPRM^No Sperm Seen

## 2019-07-18 NOTE — RESULT ENCOUNTER NOTE
Please send the following letter:    Mr. English,    -All of your labs are normal.  You're good to go!    If you have further questions about the interpretation of your labs, [email or web address] is a good website to check out for further information.      Please contact the clinic if you have additional questions.  Thank you.    Sincerely,    Anton Randall MD

## 2020-02-25 ENCOUNTER — OFFICE VISIT (OUTPATIENT)
Dept: FAMILY MEDICINE | Facility: CLINIC | Age: 45
End: 2020-02-25
Payer: COMMERCIAL

## 2020-02-25 VITALS
SYSTOLIC BLOOD PRESSURE: 110 MMHG | HEART RATE: 45 BPM | BODY MASS INDEX: 28.19 KG/M2 | DIASTOLIC BLOOD PRESSURE: 80 MMHG | OXYGEN SATURATION: 100 % | HEIGHT: 68 IN | WEIGHT: 186 LBS | TEMPERATURE: 97.4 F

## 2020-02-25 DIAGNOSIS — F10.20 UNCOMPLICATED ALCOHOL DEPENDENCE (H): ICD-10-CM

## 2020-02-25 DIAGNOSIS — Z13.1 SCREENING FOR DIABETES MELLITUS: ICD-10-CM

## 2020-02-25 DIAGNOSIS — Z00.00 ROUTINE GENERAL MEDICAL EXAMINATION AT A HEALTH CARE FACILITY: Primary | ICD-10-CM

## 2020-02-25 DIAGNOSIS — Z13.220 SCREENING FOR LIPID DISORDERS: ICD-10-CM

## 2020-02-25 DIAGNOSIS — F10.930 ALCOHOL WITHDRAWAL, UNCOMPLICATED (H): ICD-10-CM

## 2020-02-25 LAB
ALBUMIN SERPL-MCNC: 4.2 G/DL (ref 3.4–5)
ALP SERPL-CCNC: 44 U/L (ref 40–150)
ALT SERPL W P-5'-P-CCNC: 40 U/L (ref 0–70)
ANION GAP SERPL CALCULATED.3IONS-SCNC: 5 MMOL/L (ref 3–14)
AST SERPL W P-5'-P-CCNC: 30 U/L (ref 0–45)
BILIRUB SERPL-MCNC: 1.3 MG/DL (ref 0.2–1.3)
BUN SERPL-MCNC: 22 MG/DL (ref 7–30)
CALCIUM SERPL-MCNC: 8.9 MG/DL (ref 8.5–10.1)
CHLORIDE SERPL-SCNC: 107 MMOL/L (ref 94–109)
CHOLEST SERPL-MCNC: 246 MG/DL
CO2 SERPL-SCNC: 24 MMOL/L (ref 20–32)
CREAT SERPL-MCNC: 1.08 MG/DL (ref 0.66–1.25)
GFR SERPL CREATININE-BSD FRML MDRD: 83 ML/MIN/{1.73_M2}
GLUCOSE SERPL-MCNC: 100 MG/DL (ref 70–99)
HDLC SERPL-MCNC: 75 MG/DL
LDLC SERPL CALC-MCNC: 147 MG/DL
NONHDLC SERPL-MCNC: 171 MG/DL
POTASSIUM SERPL-SCNC: 4.1 MMOL/L (ref 3.4–5.3)
PROT SERPL-MCNC: 7.7 G/DL (ref 6.8–8.8)
SODIUM SERPL-SCNC: 136 MMOL/L (ref 133–144)
TRIGL SERPL-MCNC: 118 MG/DL

## 2020-02-25 PROCEDURE — 99213 OFFICE O/P EST LOW 20 MIN: CPT | Mod: 25 | Performed by: NURSE PRACTITIONER

## 2020-02-25 PROCEDURE — 80053 COMPREHEN METABOLIC PANEL: CPT | Performed by: NURSE PRACTITIONER

## 2020-02-25 PROCEDURE — 80061 LIPID PANEL: CPT | Performed by: NURSE PRACTITIONER

## 2020-02-25 PROCEDURE — 99396 PREV VISIT EST AGE 40-64: CPT | Performed by: NURSE PRACTITIONER

## 2020-02-25 PROCEDURE — 36415 COLL VENOUS BLD VENIPUNCTURE: CPT | Performed by: NURSE PRACTITIONER

## 2020-02-25 RX ORDER — DIAZEPAM 10 MG
10 TABLET ORAL EVERY 6 HOURS PRN
Qty: 10 TABLET | Refills: 0 | Status: SHIPPED | OUTPATIENT
Start: 2020-02-25 | End: 2023-10-04

## 2020-02-25 SDOH — HEALTH STABILITY: MENTAL HEALTH: HOW OFTEN DO YOU HAVE 6 OR MORE DRINKS ON ONE OCCASION?: WEEKLY

## 2020-02-25 SDOH — HEALTH STABILITY: MENTAL HEALTH: HOW MANY STANDARD DRINKS CONTAINING ALCOHOL DO YOU HAVE ON A TYPICAL DAY?: 3 OR 4

## 2020-02-25 SDOH — HEALTH STABILITY: MENTAL HEALTH: HOW OFTEN DO YOU HAVE A DRINK CONTAINING ALCOHOL?: 4 OR MORE TIMES A WEEK

## 2020-02-25 ASSESSMENT — MIFFLIN-ST. JEOR: SCORE: 1713.19

## 2020-02-25 NOTE — PATIENT INSTRUCTIONS
Preventive Health Recommendations  Male Ages 40 to 49    Yearly exam:             See your health care provider every year in order to  o   Review health changes.   o   Discuss preventive care.    o   Review your medicines if your doctor has prescribed any.    You should be tested each year for STDs (sexually transmitted diseases) if you re at risk.     Have a cholesterol test every 5 years.     Have a colonoscopy (test for colon cancer) if someone in your family has had colon cancer or polyps before age 50.     After age 45, have a diabetes test (fasting glucose). If you are at risk for diabetes, you should have this test every 3 years.      Talk with your health care provider about whether or not a prostate cancer screening test (PSA) is right for you.    Shots: Get a flu shot each year. Get a tetanus shot every 10 years.     Nutrition:    Eat at least 5 servings of fruits and vegetables daily.     Eat whole-grain bread, whole-wheat pasta and brown rice instead of white grains and rice.     Get adequate Calcium and Vitamin D.     Lifestyle    Exercise for at least 150 minutes a week (30 minutes a day, 5 days a week). This will help you control your weight and prevent disease.     Limit alcohol to one drink per day.     No smoking.     Wear sunscreen to prevent skin cancer.     See your dentist every six months for an exam and cleaning.        Call me if you note any withdrawal symptoms today or tomorrow.     Let me know if you want to start treatment (outpatient or inpatient depending on your needs).    Consider finding AA meetings for support.     Valium for withdrawal if needed. Keep me posted.

## 2020-02-25 NOTE — PROGRESS NOTES
3  SUBJECTIVE:   CC: Derian English is an 44 year old male who presents for preventive health visit.     Healthy Habits:    Do you get at least three servings of calcium containing foods daily (dairy, green leafy vegetables, etc.)? No     Amount of exercise or daily activities, outside of work: 3-4 day(s) per week    Problems taking medications regularly not applicable    Medication side effects: No    Have you had an eye exam in the past two years? yes    Do you see a dentist twice per year? yes    Do you have sleep apnea, excessive snoring or daytime drowsiness?no      PROBLEMS TO ADD ON... Alcohol abuse.     Derian states today that he would like to get sober after 20+ years of daily ETOH use. He states that he typically drinks 3-6 drinks daily (a mix of beer and liquor). He has been hiding liquor from his wife, and she recently found a bottle in the garage. She confronted him, and he decided that he has had enough of drinking and wants to fully abstain from alcohol from here on out. His last drink was Sunday night (~36 hours ago). He denies active withdrawal symptoms, but he states he did not sleep well last night. No hallucinations, tremors, sweating, high BP. He would like to start counseling and learn about options for outpatient support. He is confident that he can maintain his new sobriety.     Today's PHQ-2 Score:   PHQ-2 ( 1999 Pf[employer]) 2/25/2020 6/28/2019   Q1: Little interest or pleasure in doing things 0 0   Q2: Feeling down, depressed or hopeless 0 0   PHQ-2 Score 0 0       Abuse: Current or Past(Physical, Sexual or Emotional)- NO  Do you feel safe in your environment? YES      Social History     Tobacco Use     Smoking status: Never Smoker     Smokeless tobacco: Never Used   Substance Use Topics     Alcohol use: Yes     Alcohol/week: 20.0 standard drinks     Types: 20 Standard drinks or equivalent per week     Frequency: 4 or more times a week     Drinks per session: 3 or 4     Binge frequency:  "Weekly     Comment: not since sunday      If you drink alcohol do you typically have >3 drinks per day or >7 drinks per week? Yes - AUDIT SCORE:     No flowsheet data found.                      Last PSA: No results found for: PSA    Reviewed orders with patient. Reviewed health maintenance and updated orders accordingly - Yes  Lab work is in process    Reviewed and updated as needed this visit by clinical staff  Tobacco  Allergies  Meds  Problems  Med Hx  Surg Hx  Fam Hx  Soc Hx          Reviewed and updated as needed this visit by Provider  Tobacco  Allergies  Meds  Problems  Med Hx  Surg Hx  Fam Hx  Soc Hx             ROS:  CONSTITUTIONAL: NEGATIVE for fever, chills, change in weight  INTEGUMENTARY/SKIN: NEGATIVE for worrisome rashes, moles or lesions  EYES: NEGATIVE for vision changes or irritation  ENT: NEGATIVE for ear, mouth and throat problems  RESP: NEGATIVE for significant cough or SOB  CV: NEGATIVE for chest pain, palpitations or peripheral edema  GI: NEGATIVE for nausea, abdominal pain, heartburn, or change in bowel habits   male: negative for dysuria, hematuria, decreased urinary stream, erectile dysfunction, urethral discharge  MUSCULOSKELETAL: NEGATIVE for significant arthralgias or myalgia  NEURO: NEGATIVE for weakness, dizziness or paresthesias  PSYCHIATRIC: See HPI    OBJECTIVE:   /80 (BP Location: Right arm, Patient Position: Chair, Cuff Size: Adult Regular)   Pulse (!) 45   Temp 97.4  F (36.3  C) (Tympanic)   Ht 1.727 m (5' 8\")   Wt 84.4 kg (186 lb)   SpO2 100%   BMI 28.28 kg/m    EXAM:  GENERAL: healthy, alert and no distress  EYES: Eyes grossly normal to inspection, PERRL and conjunctivae and sclerae normal  HENT: ear canals and TM's normal, nose and mouth without ulcers or lesions  NECK: no adenopathy, no asymmetry, masses, or scars and thyroid normal to palpation  RESP: lungs clear to auscultation - no rales, rhonchi or wheezes  CV: regular rate and rhythm, normal " S1 S2, no S3 or S4, no murmur, click or rub, no peripheral edema and peripheral pulses strong  ABDOMEN: soft, nontender, no hepatosplenomegaly, no masses and bowel sounds normal  MS: no gross musculoskeletal defects noted, no edema  SKIN: no suspicious lesions or rashes  NEURO: Normal strength and tone, mentation intact and speech normal  PSYCH: mentation appears normal, affect normal/bright    Diagnostic Test Results:  No results found for this or any previous visit (from the past 24 hour(s)).    ASSESSMENT/PLAN:   Derian was seen today for physical.    Diagnoses and all orders for this visit:    Routine general medical examination at a health care facility  Comment: Feels well. Has decided to stop drinking (see below).     Screening for lipid disorders  -     Lipid panel reflex to direct LDL Fasting    Screening for diabetes mellitus  -     Comprehensive metabolic panel    Uncomplicated alcohol dependence (H)  Comment: Congratulated Derian on his decision and offered my support in any form available to me. I have recommended that he find a support system (ie AA), so he can interact with those facing similar struggles. His wife is also quite supportive based on his report. I have also ordered an outpatient therapy referral (he prefers general counseling as opposed to specific CD treatment / therapy). I also offered to contact social work about finding CD outpatient programs, but he declines at this time, stating that he will reach out if he encounters hurdles that would make this a good idea.   -     MENTAL HEALTH REFERRAL  - Adult; Outpatient Treatment; Individual/Couples/Family/Group Therapy/Health Psychology; [facility]: [facility] [phone]; We will contact you to schedule the appointment or please call with any questions    Alcohol withdrawal, uncomplicated (H)  Comment: No sign of active withdrawal, but it has only been 36 hours or so since his last drink. I will place Rx for Valium on standby at  "his pharmacy, so he can call and have this filled if he encounters classic withdrawal syndrome symptoms today or tomorrow. He will also keep me posted closely regarding this issue, so we can monitor him closely for complications that would make ED presentation / inpatient ETOH withdrawal a better option.  Discussed reasons to call or return to clinic. Derian acknowledges and demonstrates understanding of circumstances under which care should be sought urgently or emergently. Follow up as discussed. Discussed risks, benefits, alternatives, potential side effects, and proper administration of new medication / treatment. Agrees with plan of care. All questions answered.   -     diazepam (VALIUM) 10 MG tablet; Take 1 tablet (10 mg) by mouth every 6 hours as needed for withdrawal        COUNSELING:  Reviewed preventive health counseling, as reflected in patient instructions    Estimated body mass index is 28.28 kg/m  as calculated from the following:    Height as of this encounter: 1.727 m (5' 8\").    Weight as of this encounter: 84.4 kg (186 lb).    Weight management plan: Discussed healthy diet and exercise guidelines     reports that he has never smoked. He has never used smokeless tobacco.      Counseling Resources:  ATP IV Guidelines  Pooled Cohorts Equation Calculator  FRAX Risk Assessment  ICSI Preventive Guidelines  Dietary Guidelines for Americans, 2010  USDA's MyPlate  ASA Prophylaxis  Lung CA Screening    Taqueria Krueger NP  [facility]  "

## 2020-03-10 ENCOUNTER — OFFICE VISIT (OUTPATIENT)
Dept: PSYCHOLOGY | Facility: CLINIC | Age: 45
End: 2020-03-10
Attending: NURSE PRACTITIONER
Payer: COMMERCIAL

## 2020-03-10 DIAGNOSIS — F10.10 ALCOHOL USE DISORDER, MILD, ABUSE: Primary | ICD-10-CM

## 2020-03-10 PROCEDURE — 90791 PSYCH DIAGNOSTIC EVALUATION: CPT

## 2020-03-10 ASSESSMENT — COLUMBIA-SUICIDE SEVERITY RATING SCALE - C-SSRS
2. HAVE YOU ACTUALLY HAD ANY THOUGHTS OF KILLING YOURSELF LIFETIME?: NO
4. HAVE YOU HAD THESE THOUGHTS AND HAD SOME INTENTION OF ACTING ON THEM?: NO
3. HAVE YOU BEEN THINKING ABOUT HOW YOU MIGHT KILL YOURSELF?: NO
1. IN YOUR LIFETIME, HAVE YOU WISHED YOU WERE DEAD OR WISHED YOU COULD GO TO SLEEP AND NOT WAKE UP?: YEARS AGO
5. HAVE YOU STARTED TO WORK OUT OR WORKED OUT THE DETAILS OF HOW TO KILL YOURSELF? DO YOU INTEND TO CARRY OUT THIS PLAN?: NO
2. HAVE YOU ACTUALLY HAD ANY THOUGHTS OF KILLING YOURSELF?: NO
1. IN THE PAST MONTH, HAVE YOU WISHED YOU WERE DEAD OR WISHED YOU COULD GO TO SLEEP AND NOT WAKE UP?: YES
4. HAVE YOU HAD THESE THOUGHTS AND HAD SOME INTENTION OF ACTING ON THEM?: NO
5. HAVE YOU STARTED TO WORK OUT OR WORKED OUT THE DETAILS OF HOW TO KILL YOURSELF? DO YOU INTEND TO CARRY OUT THIS PLAN?: NO
1. IN THE PAST MONTH, HAVE YOU WISHED YOU WERE DEAD OR WISHED YOU COULD GO TO SLEEP AND NOT WAKE UP?: NO

## 2020-03-10 ASSESSMENT — ANXIETY QUESTIONNAIRES
2. NOT BEING ABLE TO STOP OR CONTROL WORRYING: SEVERAL DAYS
5. BEING SO RESTLESS THAT IT IS HARD TO SIT STILL: SEVERAL DAYS
IF YOU CHECKED OFF ANY PROBLEMS ON THIS QUESTIONNAIRE, HOW DIFFICULT HAVE THESE PROBLEMS MADE IT FOR YOU TO DO YOUR WORK, TAKE CARE OF THINGS AT HOME, OR GET ALONG WITH OTHER PEOPLE: NOT DIFFICULT AT ALL
7. FEELING AFRAID AS IF SOMETHING AWFUL MIGHT HAPPEN: SEVERAL DAYS
4. TROUBLE RELAXING: SEVERAL DAYS
6. BECOMING EASILY ANNOYED OR IRRITABLE: SEVERAL DAYS
1. FEELING NERVOUS, ANXIOUS, OR ON EDGE: SEVERAL DAYS
GAD7 TOTAL SCORE: 7
3. WORRYING TOO MUCH ABOUT DIFFERENT THINGS: SEVERAL DAYS

## 2020-03-10 ASSESSMENT — PATIENT HEALTH QUESTIONNAIRE - PHQ9: SUM OF ALL RESPONSES TO PHQ QUESTIONS 1-9: 6
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
## 2020-03-11 ASSESSMENT — ANXIETY QUESTIONNAIRES: GAD7 TOTAL SCORE: 7

## 2020-06-24 ENCOUNTER — VIRTUAL VISIT (OUTPATIENT)
Dept: PSYCHOLOGY | Facility: CLINIC | Age: 45
End: 2020-06-24
Payer: COMMERCIAL

## 2020-06-24 DIAGNOSIS — F43.23 ADJUSTMENT DISORDER WITH MIXED ANXIETY AND DEPRESSED MOOD: Primary | ICD-10-CM

## 2020-06-24 DIAGNOSIS — F10.10 ALCOHOL USE DISORDER, MILD, ABUSE: ICD-10-CM

## 2020-06-24 PROCEDURE — 90834 PSYTX W PT 45 MINUTES: CPT | Mod: 95

## 2020-06-24 NOTE — PATIENT INSTRUCTIONS
Try sleep yoga.  Identify opportunity for transition from work to home (exercise).  Have conversations with wife about how to support each other.

## 2020-06-24 NOTE — PROGRESS NOTES
"                                           Progress Note    Patient Name: Derian English  Date: 6/24/2020         Service Type: Individual      Session Start Time: 9:35  Session End Time: 10:20     Session Length: 45    Session #: 2  (Intake appt 3/10/20)    Attendees: Client attended alone    The patient has been notified of the following:      \"We have found that certain health care needs can be provided without the need for a face to face visit.  This service lets us provide the care you need with a phone conversation.       I will have full access to your [place] medical record during this entire phone call.   I will be taking notes for your medical record.      Since this is like an office visit, we will bill your insurance company for this service.       There are potential benefits and risks of telephone visits (e.g. limits to patient confidentiality) that differ from in-person visits.?  Confidentiality still applies for telephone services, and nobody will record the visit.  It is important to be in a quiet, private space that is free of distractions (including cell phone or other devices) during the visit.??      If during the course of the call I believe a telephone visit is not appropriate, you will not be charged for this service\"     Consent has been obtained for this service by care team member: Yes         Treatment Plan Last Reviewed: 6/24  PHQ-9 / KELVIN-7 :  Not assessed    DATA  Interactive Complexity: No  Crisis: No       Progress Since Last Session (Related to Symptoms / Goals / Homework):   Symptoms: one relapse regarding alcohol.     Homework: Did not complete      Episode of Care Goals: Minimal progress - CONTEMPLATION (Considering change and yet undecided); Intervened by assessing the negative and positive thinking (ambivalence) about behavior change     Current / Ongoing Stressors and Concerns:   Current: Stopped drinking for 3.5 months, went to [place] on vacation to visit in-laws who " drink, had one instance where he reverted to drinking out of the bottle; wife caught him and he recognized that drinking remains something he can't do in moderation; stopped drinking again.         Treatment Objective(s) Addressed in This Session:   maintain sobriety using coping skills and communication to support wellness and abstention.        Intervention:   Solution Focused: Surfaced experience of needing a replacement for coming home from work and wanting to drink. Identified strategy for integrating self care into transition to home and using proactive communication strategies to collaborate with wife regarding their individual needs. Identified personal pattern of being a people pleaser and conflict avoidant, while also reporting having trouble over lifetime with authority figures. Explored sleep struggles and identified need for routine and potential integration of yoga at bedtime and with daughters who are interested in the practice.        ASSESSMENT: Current Emotional / Mental Status (status of significant symptoms):   Risk status (Self / Other harm or suicidal ideation)   Patient denies current fears or concerns for personal safety.   Patient denies current or recent suicidal ideation or behaviors.   Patient denies current or recent homicidal ideation or behaviors.   Patient denies current or recent self injurious behavior or ideation.   Patient denies other safety concerns.   Patient reports there has been no change in risk factors since their last session.     Patient reports there has been no change in protective factors since their last session.     Recommended that patient call 911 or go to the local ED should there be a change in any of these risk factors.     Appearance:    phone visit-could not assess    Eye Contact:    phone visit-could not assess   Psychomotor Behavior:  phone visit-could not assess   Attitude:   Interested Yosef insightful   Orientation:   All   Speech    Rate /  Production: Normal/ Responsive Normal     Volume:  Normal    Mood:    Normal   Affect:    Appropriate    Thought Content:  Clear    Thought Form:  Coherent  Logical    Insight:    Good      Medication Review:   No changes to current psychiatric medication(s)     Medication Compliance:   Yes     Changes in Health Issues:   None reported     Chemical Use Review:   Substance Use: decrease in alcohol .  Patient reports frequency of use: Didn't drink for 3.5 months, then drank during vacation in [place] a couple of weeks ago-one binge. Has not consumed alcohol since then. Provided encouragement towards sobriety and provided support and affirmation for steps taken towards sobriety         Tobacco Use: No current tobacco use.      Diagnosis:  1. Adjustment disorder with mixed anxiety and depressed mood    2. Alcohol use disorder, mild, abuse        Collateral Reports Completed:   Routed note to PCP    PLAN: (Patient Tasks / Therapist Tasks / Other)  Work with wife to prioritize integrating daily exercise into schedule as a transition to home. Support wife in her self care needs. Start practicing yoga with daughters, and as part of bedtime practice to support sleep.         YUN Steen, [facility] 6/24/20  Note reviewed and clinical supervision by YUN Gomez [facility] 6/30/2020   ______________________________________________________________________    Treatment Plan    Patient's Name: Derian English  YOB: 1975    Date: 6/24/20    DSM5 Diagnoses: Substance-Related & Addictive Disorders Alcohol Use Disorder   305.00 (F10.10) Mild In early remission,   Psychosocial / Contextual Factors: limited coping skills/outlets for stress, relational stressors influenced by two working parents  WHODAS:  11     Referral / Collaboration:  Referral to another professional/service is not indicated at this time..    Anticipated number of session or this episode of care: 10      MeasurableTreatment Goal(s) related to  diagnosis / functional impairment(s)  Goal 1: Patient will move toward sobriety and acquire the skills to support a reduction in depression and anxiety symptoms and maintain long term sobriety using behavioral and cognitive coping skills and stabilizing physically and emotionally.    I will know I've met my goal when my mood is better and alcohol is replaced with healthy stuff (paraphrase).      Objective #A (Patient Action)  Patient will explore and resolve ambivalence associated with commitment to change behaviors related to relational communications, substance use and addiction.  Status: New - Date: 6/24/20     Intervention(s)  Therapist will use a nondirective, client-centered empathic style of motivational interviewing, establish rapport with client. Teach proactive communication strategies and role play in session. Have client make a list of the ways substance abuse has negatively impacted his life, and the positive impact non use may have. Ask open-ended questions to explore motivation for change, elicit recognition of gap between current behavior and desired life goals.    Objective #B  Patient will commit self to an action plan directed toward behavior change for wellness to include continued abstinence from alcohol use. Make amends to others who have been impacted by alcohol use.  Status: New - Date: 6/24/20     Intervention(s)  Therapist will encourage client's self efficacy for change in developing an exercise and wellness action plan and abstinence contract; processing client's feelings related to the commitment. Recommend that client attend AA meetings.     Objective #C  Patient will understand and integrate knowledge of alcoholism and the factors that can contribute to the development of chemical dependence and pose risks for relapse.  Status: New - Date: 6/24/20     Intervention(s)  Therapist will support client in recognizing intellectual, personality and cognitive vulnerabilities, family history  and life stressors that contribute to alcohol dependence. Facilitate client understanding of genetic factors, including childhood experiences that led to alcohol dependency and are risk factors for relapse..    Objective #D  Patient will engage in understanding how increasing satisfaction in areas of life can support sobriety, including recreation, improving current relationships and engaging in new healthy relationships.  Status: New - Date: 6/24/20     Intervention(s)  Therapist will support client in identifying sources of non-drinking recreation and social friendships, using problem-solving and communication skills to overcome obstacles. Therapist will teach proactive collaborative communication strategies to reduce  relationship conflict and deepen the relationship partnership and connection to include pleasurable activities, and problem solving.    Patient has not reviewed nor agreed to the above plan.      YUN Steen, LGSW  June 24, 2020  Note reviewed and clinical supervision by [facility]

## 2020-07-08 ASSESSMENT — ANXIETY QUESTIONNAIRES
1. FEELING NERVOUS, ANXIOUS, OR ON EDGE: SEVERAL DAYS
GAD7 TOTAL SCORE: 4
5. BEING SO RESTLESS THAT IT IS HARD TO SIT STILL: SEVERAL DAYS
2. NOT BEING ABLE TO STOP OR CONTROL WORRYING: NOT AT ALL
7. FEELING AFRAID AS IF SOMETHING AWFUL MIGHT HAPPEN: NOT AT ALL
GAD7 TOTAL SCORE: 4
4. TROUBLE RELAXING: SEVERAL DAYS
3. WORRYING TOO MUCH ABOUT DIFFERENT THINGS: NOT AT ALL
6. BECOMING EASILY ANNOYED OR IRRITABLE: SEVERAL DAYS
7. FEELING AFRAID AS IF SOMETHING AWFUL MIGHT HAPPEN: NOT AT ALL

## 2020-07-08 ASSESSMENT — PATIENT HEALTH QUESTIONNAIRE - PHQ9
10. IF YOU CHECKED OFF ANY PROBLEMS, HOW DIFFICULT HAVE THESE PROBLEMS MADE IT FOR YOU TO DO YOUR WORK, TAKE CARE OF THINGS AT HOME, OR GET ALONG WITH OTHER PEOPLE: NOT DIFFICULT AT ALL
SUM OF ALL RESPONSES TO PHQ QUESTIONS 1-9: 5
SUM OF ALL RESPONSES TO PHQ QUESTIONS 1-9: 5

## 2020-07-09 ASSESSMENT — ANXIETY QUESTIONNAIRES: GAD7 TOTAL SCORE: 4

## 2020-07-09 ASSESSMENT — PATIENT HEALTH QUESTIONNAIRE - PHQ9: SUM OF ALL RESPONSES TO PHQ QUESTIONS 1-9: 5

## 2020-07-14 ENCOUNTER — VIRTUAL VISIT (OUTPATIENT)
Dept: PSYCHOLOGY | Facility: CLINIC | Age: 45
End: 2020-07-14
Payer: COMMERCIAL

## 2020-07-14 DIAGNOSIS — F43.23 ADJUSTMENT DISORDER WITH MIXED ANXIETY AND DEPRESSED MOOD: Primary | ICD-10-CM

## 2020-07-14 PROCEDURE — 90834 PSYTX W PT 45 MINUTES: CPT | Mod: 95

## 2020-07-14 NOTE — PATIENT INSTRUCTIONS
Prioritize scheduling social time with friends. Use SHEA and FAST skills for communication with wife.  LINK:   [email or web address]

## 2020-07-14 NOTE — PROGRESS NOTES
Progress Note    Patient Name: Derian English  Date: 7/14/2020         Service Type: Individual      Session Start Time: 12:35  Session End Time: 1:20     Session Length: 45    Session #: 3  (Intake appt 3/10/20)    Attendees: Client attended alone    Telemedicine Visit: The patient's condition can be safely assessed and treated via synchronous audio and visual telemedicine encounter.      Reason for Telemedicine Visit: Services only offered telehealth    Originating Site (Patient Location): Patient's other workplace    Distant Site (Provider Location): Provider Remote Setting-home office    Consent:  The patient/guardian has verbally consented to: the potential risks and benefits of telemedicine (video visit) versus in person care; bill my insurance or make self-payment for services provided; and responsibility for payment of non-covered services.     Mode of Communication:  Video Conference via [facility]    As the provider I attest to compliance with applicable laws and regulations related to telemedicine.       Treatment Plan Last Reviewed: 6/24/20  PHQ-9 / KELVIN-7 :  Answers for HPI/ROS submitted by the patient on 7/8/2020   KELVIN 7 TOTAL SCORE: 4  If you checked off any problems, how difficult have these problems made it for you to do your work, take care of things at home, or get along with other people?: Not difficult at all  PHQ9 TOTAL SCORE: 5    DATA  Interactive Complexity: No  Crisis: No       Progress Since Last Session (Related to Symptoms / Goals / Homework):   Symptoms: Improving.  Reduction in symptoms of depression, with continued sleep disturbance.    Homework: Partially completed      Episode of Care Goals: Minimal progress - CONTEMPLATION (Considering change and yet undecided); Intervened by assessing the negative and positive thinking (ambivalence) about behavior change     Current / Ongoing Stressors and Concerns:  Current: Relational conflict,  preparing house for potential sale     Treatment Objective(s) Addressed in This Session:     Patient will use interpersonal effectiveness, proactive communication, distress tolerance and limit setting skills in relationships     Intervention:  DBT:  Taught and role played interpersonal effectiveness skills of SHEA and FAST related to a relational conflict that requires communication in order to move forward. Surfaced pattern of avoiding conflict at home and the impact on spousal and parenting relationships.      ASSESSMENT: Current Emotional / Mental Status (status of significant symptoms):   Risk status (Self / Other harm or suicidal ideation)   Patient denies current fears or concerns for personal safety.   Patient denies current or recent suicidal ideation or behaviors.   Patient denies current or recent homicidal ideation or behaviors.   Patient denies current or recent self injurious behavior or ideation.   Patient denies other safety concerns.   Patient reports there has been no change in risk factors since their last session.     Patient reports there has been no change in protective factors since their last session.     Recommended that patient call 911 or go to the local ED should there be a change in any of these risk factors.     Appearance:    Appropriate   Eye Contact:    Good   Psychomotor Behavior:  Comfortable gait   Attitude:   Interested Yosef    Orientation:   All   Speech    Rate / Production: Normal/ Responsive Normal     Volume:  Normal    Mood:    Normal   Affect:    Appropriate    Thought Content:  Clear    Thought Form:  Coherent  Logical    Insight:    Good      Medication Review:   No changes to current psychiatric medication(s)     Medication Compliance:   Yes     Changes in Health Issues:   None reported     Chemical Use Review:  Substance Use: decrease in alcohol. Patient reports frequency of use: 7 of the last 21 days.   Tobacco Use: No current tobacco use.      Diagnosis:  1.  Adjustment disorder with mixed anxiety and depressed mood        Collateral Reports Completed:   Routed note to PCP    PLAN: (Patient Tasks / Therapist Tasks / Other)  Prioritize scheduling social time with friends. Use SHEA and FAST skills for communication with wife.  [email or web address]/worksheets/yer-rzoovhcdibbxp-irzwhpxaymquc-skills.pdf    YUN Steen, [facility] 7/14/20  Note reviewed and clinical supervision by YUN Gomez [facility] 7/17/2020   ______________________________________________________________________    Treatment Plan    Patient's Name: Derian English  YOB: 1975    Date: 6/24/20    DSM5 Diagnoses: Adjustment Disorders  309.28 (F43.23) With mixed anxiety and depressed mood   Psychosocial / Contextual Factors: limited coping skills/outlets for stress, relational stressors influenced by two working parents  WHODAS:  11     Referral / Collaboration:  Referral to another professional/service is not indicated at this time..    Anticipated number of session or this episode of care: 10      MeasurableTreatment Goal(s) related to diagnosis / functional impairment(s)  Goal 1: Patient will move toward sobriety and acquire the skills to support a reduction in depression and anxiety symptoms and maintain long term sobriety using behavioral and cognitive coping skills and stabilizing physically and emotionally.    I will know I've met my goal when my mood is better and alcohol is replaced with healthy stuff (paraphrase).      Objective #A (Patient Action)  Patient will explore and resolve ambivalence associated with commitment to change behaviors related to relational communications, substance use and addiction.  Status: New - Date: 6/24/20     Intervention(s)  Therapist will use a nondirective, client-centered empathic style of motivational interviewing, establish rapport with client. Teach proactive communication strategies and role play in session. Have client make a  list of the ways substance abuse has negatively impacted his life, and the positive impact non use may have. Ask open-ended questions to explore motivation for change, elicit recognition of gap between current behavior and desired life goals.    Objective #B  Patient will commit self to an action plan directed toward behavior change for wellness to include continued abstinence from alcohol use. Make amends to others who have been impacted by alcohol use.  Status: New - Date: 6/24/20     Intervention(s)  Therapist will encourage client's self efficacy for change in developing an exercise and wellness action plan and abstinence contract; processing client's feelings related to the commitment. Recommend that client attend AA meetings.     Objective #C  Patient will understand and integrate knowledge of alcoholism and the factors that can contribute to the development of chemical dependence and pose risks for relapse.  Status: New - Date: 6/24/20     Intervention(s)  Therapist will support client in recognizing intellectual, personality and cognitive vulnerabilities, family history and life stressors that contribute to alcohol dependence. Facilitate client understanding of genetic factors, including childhood experiences that led to alcohol dependency and are risk factors for relapse..    Objective #D  Patient will engage in understanding how increasing satisfaction in areas of life can support wellness and improvement in functioning, including recreation, improving current relationships and engaging in new healthy relationships.  Status: New - Date: 6/24/20     Intervention(s)  Therapist will support client in identifying sources of non-drinking recreation and social friendships, using problem-solving, behavioral activation and communication skills to overcome obstacles. Therapist will teach proactive collaborative communication strategies to reduce  relationship conflict and deepen the relationship partnership  and connection to include pleasurable activities, and problem solving.    Objective #E  Patient will use  interpersonal effectiveness, proactive communication, distress tolerance and limit setting skills in relationships with family members . Status: New - Date: 6/24/20     Intervention(s)  Therapist will teach CBT, DBT , and proactive communication skills and practice role plays in sessions.    Patient has not reviewed nor agreed to the above plan.      YUN Steen, SW  June 24, 2020  Note reviewed and clinical supervision by YUN Gomez [facility] 6/30/2020

## 2020-08-10 ENCOUNTER — VIRTUAL VISIT (OUTPATIENT)
Dept: PSYCHOLOGY | Facility: CLINIC | Age: 45
End: 2020-08-10
Payer: COMMERCIAL

## 2020-08-10 DIAGNOSIS — F43.23 ADJUSTMENT DISORDER WITH MIXED ANXIETY AND DEPRESSED MOOD: Primary | ICD-10-CM

## 2020-08-10 PROCEDURE — 90834 PSYTX W PT 45 MINUTES: CPT | Mod: 95

## 2020-08-10 ASSESSMENT — ANXIETY QUESTIONNAIRES
GAD7 TOTAL SCORE: 5
4. TROUBLE RELAXING: MORE THAN HALF THE DAYS
3. WORRYING TOO MUCH ABOUT DIFFERENT THINGS: NOT AT ALL
5. BEING SO RESTLESS THAT IT IS HARD TO SIT STILL: MORE THAN HALF THE DAYS
2. NOT BEING ABLE TO STOP OR CONTROL WORRYING: NOT AT ALL
GAD7 TOTAL SCORE: 5
1. FEELING NERVOUS, ANXIOUS, OR ON EDGE: SEVERAL DAYS
7. FEELING AFRAID AS IF SOMETHING AWFUL MIGHT HAPPEN: NOT AT ALL
7. FEELING AFRAID AS IF SOMETHING AWFUL MIGHT HAPPEN: NOT AT ALL
GAD7 TOTAL SCORE: 5
6. BECOMING EASILY ANNOYED OR IRRITABLE: NOT AT ALL

## 2020-08-10 ASSESSMENT — PATIENT HEALTH QUESTIONNAIRE - PHQ9
SUM OF ALL RESPONSES TO PHQ QUESTIONS 1-9: 4
SUM OF ALL RESPONSES TO PHQ QUESTIONS 1-9: 4

## 2020-08-10 NOTE — PROGRESS NOTES
Progress Note    Patient Name: Derian English  Date: 8/10/2020         Service Type: Individual      Session Start Time: 9:35  Session End Time: 10:20     Session Length: 45    Session #: 4  (Intake appt 3/10/20)    Attendees: Client attended alone    Telemedicine Visit: The patient's condition can be safely assessed and treated via synchronous audio and visual telemedicine encounter.      Reason for Telemedicine Visit: Services only offered telehealth    Originating Site (Patient Location): Patient's other workplace    Distant Site (Provider Location): Provider Remote Setting-home office    Consent:  The patient/guardian has verbally consented to: the potential risks and benefits of telemedicine (video visit) versus in person care; bill my insurance or make self-payment for services provided; and responsibility for payment of non-covered services.     Mode of Communication:  Video Conference via [employer]    As the provider I attest to compliance with applicable laws and regulations related to telemedicine.       Treatment Plan Last Reviewed: 6/24/20  PHQ-9 / KELVIN-7 :   Answers for HPI/ROS submitted by the patient on 8/10/2020   PHQ9 TOTAL SCORE: 4  KELVIN 7 TOTAL SCORE: 5      DATA  Interactive Complexity: No  Crisis: No       Progress Since Last Session (Related to Symptoms / Goals / Homework):   Symptoms: Improving.  Reduction in symptoms of depression.    Homework: Partially completed      Episode of Care Goals: Satisfactory progress - ACTION (Actively working towards change); Intervened by reinforcing change plan / affirming steps taken     Current / Ongoing Stressors and Concerns:  Current: Workplace stressors-/furloughs, relational conflict, hidden drinking, preparing house for potential sale     Treatment Objective(s) Addressed in This Session:     Patient will use interpersonal effectiveness, proactive communication, distress tolerance and limit setting skills in  "relationships     Intervention:  DBT:  Processed experience of concealing drinking and being found out, followed up by using learned skills to open communication with wife and finding a path to work together as a team aligned with shared goals in place of past pattern of avoiding conflict at home.  Surfaced knowledge of benefits/limitations of \" brain\" in relationships.   CBT: Revisited need for renewing friend connections and wellness related activity.      ASSESSMENT: Current Emotional / Mental Status (status of significant symptoms):   Risk status (Self / Other harm or suicidal ideation)   Patient denies current fears or concerns for personal safety.   Patient denies current or recent suicidal ideation or behaviors.   Patient denies current or recent homicidal ideation or behaviors.   Patient denies current or recent self injurious behavior or ideation.   Patient denies other safety concerns.   Patient reports there has been no change in risk factors since their last session.     Patient reports there has been no change in protective factors since their last session.     Recommended that patient call 911 or go to the local ED should there be a change in any of these risk factors.     Appearance:    Appropriate   Eye Contact:    Good   Psychomotor Behavior:  Appropriate   Attitude:   Interested Yosef    Orientation:   All   Speech    Rate / Production: Normal/ Responsive Normal     Volume:  Normal    Mood:    Normal   Affect:    Appropriate    Thought Content:  Clear    Thought Form:  Coherent  Logical    Insight:    Good      Medication Review:   No changes to current psychiatric medication(s)     Medication Compliance:   Yes     Changes in Health Issues:   None reported     Chemical Use Review:  Substance Use: decrease in alcohol. Patient reports frequency of use: 7 of the last 21 days.   Tobacco Use: No current tobacco use.      Diagnosis:  1. Adjustment disorder with mixed anxiety and depressed mood  "       Collateral Reports Completed:   Routed note to PCP    PLAN: (Patient Tasks / Therapist Tasks / Other)  Prioritize scheduling social time with friends. Use SHEA and FAST skills for communication with wife.  [email or web address]/worksheets/nyt-fxmcglbdvdima-ncjzzrqqmgale-skills.pdf    YUN Steen, [facility] 8/10/20  Note reviewed and clinical supervision by YUN Gomez [facility] 8/13/2020   ______________________________________________________________________    Treatment Plan    Patient's Name: Derian English  YOB: 1975    Date: 6/24/20    DSM5 Diagnoses: Adjustment Disorders  309.28 (F43.23) With mixed anxiety and depressed mood   Psychosocial / Contextual Factors: limited coping skills/outlets for stress, relational stressors influenced by two working parents  WHODAS:  11     Referral / Collaboration:  Referral to another professional/service is not indicated at this time..    Anticipated number of session or this episode of care: 10      MeasurableTreatment Goal(s) related to diagnosis / functional impairment(s)  Goal 1: Patient will move toward sobriety and acquire the skills to support a reduction in depression and anxiety symptoms and maintain long term sobriety using behavioral and cognitive coping skills and stabilizing physically and emotionally.    I will know I've met my goal when my mood is better and alcohol is replaced with healthy stuff (paraphrase).      Objective #A (Patient Action)  Patient will explore and resolve ambivalence associated with commitment to change behaviors related to relational communications, substance use and addiction.  Status: New - Date: 6/24/20     Intervention(s)  Therapist will use a nondirective, client-centered empathic style of motivational interviewing, establish rapport with client. Teach proactive communication strategies and role play in session. Have client make a list of the ways substance abuse has negatively impacted his  life, and the positive impact non use may have. Ask open-ended questions to explore motivation for change, elicit recognition of gap between current behavior and desired life goals.    Objective #B  Patient will commit self to an action plan directed toward behavior change for wellness to include continued abstinence from alcohol use. Make amends to others who have been impacted by alcohol use.  Status: New - Date: 6/24/20     Intervention(s)  Therapist will encourage client's self efficacy for change in developing an exercise and wellness action plan and abstinence contract; processing client's feelings related to the commitment. Recommend that client attend AA meetings.     Objective #C  Patient will understand and integrate knowledge of alcoholism and the factors that can contribute to the development of chemical dependence and pose risks for relapse.  Status: New - Date: 6/24/20     Intervention(s)  Therapist will support client in recognizing intellectual, personality and cognitive vulnerabilities, family history and life stressors that contribute to alcohol dependence. Facilitate client understanding of genetic factors, including childhood experiences that led to alcohol dependency and are risk factors for relapse..    Objective #D  Patient will engage in understanding how increasing satisfaction in areas of life can support wellness and improvement in functioning, including recreation, improving current relationships and engaging in new healthy relationships.  Status: New - Date: 6/24/20     Intervention(s)  Therapist will support client in identifying sources of non-drinking recreation and social friendships, using problem-solving, behavioral activation and communication skills to overcome obstacles. Therapist will teach proactive collaborative communication strategies to reduce  relationship conflict and deepen the relationship partnership and connection to include pleasurable activities, and problem  solving.    Objective #E  Patient will use  interpersonal effectiveness, proactive communication, distress tolerance and limit setting skills in relationships with family members . Status: New - Date: 6/24/20     Intervention(s)  Therapist will teach CBT, DBT , and proactive communication skills and practice role plays in sessions.    Patient has not reviewed nor agreed to the above plan.      YUN Steen, LGSW  June 24, 2020  Note reviewed and clinical supervision by [facility]

## 2020-08-11 ASSESSMENT — ANXIETY QUESTIONNAIRES: GAD7 TOTAL SCORE: 5

## 2020-08-11 ASSESSMENT — PATIENT HEALTH QUESTIONNAIRE - PHQ9: SUM OF ALL RESPONSES TO PHQ QUESTIONS 1-9: 4

## 2020-08-24 ENCOUNTER — VIRTUAL VISIT (OUTPATIENT)
Dept: PSYCHOLOGY | Facility: CLINIC | Age: 45
End: 2020-08-24
Payer: COMMERCIAL

## 2020-08-24 DIAGNOSIS — F43.23 ADJUSTMENT DISORDER WITH MIXED ANXIETY AND DEPRESSED MOOD: Primary | ICD-10-CM

## 2020-08-24 PROCEDURE — 90834 PSYTX W PT 45 MINUTES: CPT | Mod: 95

## 2020-08-24 ASSESSMENT — ANXIETY QUESTIONNAIRES
GAD7 TOTAL SCORE: 3
2. NOT BEING ABLE TO STOP OR CONTROL WORRYING: NOT AT ALL
GAD7 TOTAL SCORE: 3
1. FEELING NERVOUS, ANXIOUS, OR ON EDGE: NOT AT ALL
4. TROUBLE RELAXING: SEVERAL DAYS
7. FEELING AFRAID AS IF SOMETHING AWFUL MIGHT HAPPEN: NOT AT ALL
5. BEING SO RESTLESS THAT IT IS HARD TO SIT STILL: SEVERAL DAYS
6. BECOMING EASILY ANNOYED OR IRRITABLE: SEVERAL DAYS
7. FEELING AFRAID AS IF SOMETHING AWFUL MIGHT HAPPEN: NOT AT ALL
GAD7 TOTAL SCORE: 3
3. WORRYING TOO MUCH ABOUT DIFFERENT THINGS: NOT AT ALL

## 2020-08-24 ASSESSMENT — PATIENT HEALTH QUESTIONNAIRE - PHQ9
10. IF YOU CHECKED OFF ANY PROBLEMS, HOW DIFFICULT HAVE THESE PROBLEMS MADE IT FOR YOU TO DO YOUR WORK, TAKE CARE OF THINGS AT HOME, OR GET ALONG WITH OTHER PEOPLE: NOT DIFFICULT AT ALL
SUM OF ALL RESPONSES TO PHQ QUESTIONS 1-9: 1
SUM OF ALL RESPONSES TO PHQ QUESTIONS 1-9: 1

## 2020-08-24 NOTE — PROGRESS NOTES
Progress Note    Patient Name: Derian English  Date: 8/24/2020         Service Type: Individual      Session Start Time: 11:35  Session End Time: 12:20     Session Length: 45    Session #: 5  (Intake appt 3/10/20)    Attendees: Client attended alone    Telemedicine Visit: The patient's condition can be safely assessed and treated via synchronous audio and visual telemedicine encounter.      Reason for Telemedicine Visit: Services only offered telehealth    Originating Site (Patient Location): Patient's other workplace    Distant Site (Provider Location): Provider Remote Setting-home office    Consent:  The patient/guardian has verbally consented to: the potential risks and benefits of telemedicine (video visit) versus in person care; bill my insurance or make self-payment for services provided; and responsibility for payment of non-covered services.     Mode of Communication:  Video Conference via [employer]    As the provider I attest to compliance with applicable laws and regulations related to telemedicine.    Treatment Plan Last Reviewed: 6/24/20  PHQ-9 / KELVIN-7 :   Answers for HPI/ROS submitted by the patient on 8/24/2020   If you checked off any problems, how difficult have these problems made it for you to do your work, take care of things at home, or get along with other people?: Not difficult at all  PHQ9 TOTAL SCORE: 1  KELVIN 7 TOTAL SCORE: 3    DATA  Interactive Complexity: No  Crisis: No       Progress Since Last Session (Related to Symptoms / Goals / Homework):   Symptoms: Improving.  Reduction in symptoms of depression.    Homework: Partially completed      Episode of Care Goals: Satisfactory progress - ACTION (Actively working towards change); Intervened by reinforcing change plan / affirming steps taken     Current / Ongoing Stressors and Concerns:  Current: Workplace stressors-/furloughs/layoffs; relational conflict, preparing house for potential  sale     Treatment Objective(s) Addressed in This Session:    Patient will use interpersonal effectiveness, proactive communication, distress tolerance and limit setting skills in  relationships     Intervention:  CBT:  Processed experience of moving alcohol use as a practice of avoidance (of conflict) to relaxation, with minimal consumption and nothing concealed or hidden, another benefit of stepping into stress in household and offering support instead of avoiding feelings. Validated effort of using skills to open communication with wife and finding a path to work together as a team. Revisited need for renewing friend connections and wellness related activity.      ASSESSMENT: Current Emotional / Mental Status (status of significant symptoms):   Risk status (Self / Other harm or suicidal ideation)   Patient denies current fears or concerns for personal safety.   Patient denies current or recent suicidal ideation or behaviors.   Patient denies current or recent homicidal ideation or behaviors.   Patient denies current or recent self injurious behavior or ideation.   Patient denies other safety concerns.   Patient reports there has been no change in risk factors since their last session.     Patient reports there has been no change in protective factors since their last session.     Recommended that patient call 911 or go to the local ED should there be a change in any of these risk factors.     Appearance:    Appropriate   Eye Contact:    Good   Psychomotor Behavior:  Appropriate   Attitude:   Interested Yosef    Orientation:   All   Speech    Rate / Production: Normal/ Responsive Normal     Volume:  Normal    Mood:    Normal   Affect:    Appropriate    Thought Content:  Clear    Thought Form:  Coherent  Logical    Insight:    Good      Medication Review:   No changes to current psychiatric medication(s)     Medication Compliance:   Yes     Changes in Health Issues:   None reported     Chemical Use  Review:  Substance Use: decrease in alcohol. Patient reports frequency of use:    Tobacco Use: No current tobacco use.      Diagnosis:  1. Adjustment disorder with mixed anxiety and depressed mood        Collateral Reports Completed:   Routed note to PCP    PLAN: (Patient Tasks / Therapist Tasks / Other)  Prioritize scheduling social time with friends. Use SHEA and FAST skills for communication with wife.  [email or web address]-[email or web address]-[email or web address]-skills.[email or web address]    YUN Steen, [facility] 8/24/20  Note reviewed and clinical supervision by YUN Gomez [facility] 8/31/2020   ______________________________________________________________________    Treatment Plan    Patient's Name: Derian English  YOB: 1975    Date: 6/24/20    DSM5 Diagnoses: Adjustment Disorders  309.28 (F43.23) With mixed anxiety and depressed mood   Psychosocial / Contextual Factors: limited coping skills/outlets for stress, relational stressors influenced by two working parents  WHODAS:  11     Referral / Collaboration:  Referral to another professional/service is not indicated at this time..    Anticipated number of session or this episode of care: 10      MeasurableTreatment Goal(s) related to diagnosis / functional impairment(s)  Goal 1: Patient will move toward sobriety and acquire the skills to support a reduction in depression and anxiety symptoms and maintain long term sobriety using behavioral and cognitive coping skills and stabilizing physically and emotionally.    I will know I've met my goal when my mood is better and alcohol is replaced with healthy stuff (paraphrase).      Objective #A (Patient Action)  Patient will explore and resolve ambivalence associated with commitment to change behaviors related to relational communications, substance use and addiction.  Status: New - Date: 6/24/20     Intervention(s)  Therapist will use a nondirective, client-centered empathic style of  motivational interviewing, establish rapport with client. Teach proactive communication strategies and role play in session. Have client make a list of the ways substance abuse has negatively impacted his life, and the positive impact non use may have. Ask open-ended questions to explore motivation for change, elicit recognition of gap between current behavior and desired life goals.    Objective #B  Patient will commit self to an action plan directed toward behavior change for wellness to include continued abstinence from alcohol use. Make amends to others who have been impacted by alcohol use.  Status: New - Date: 6/24/20     Intervention(s)  Therapist will encourage client's self efficacy for change in developing an exercise and wellness action plan and abstinence contract; processing client's feelings related to the commitment. Recommend that client attend AA meetings.     Objective #C  Patient will understand and integrate knowledge of alcoholism and the factors that can contribute to the development of chemical dependence and pose risks for relapse.  Status: New - Date: 6/24/20     Intervention(s)  Therapist will support client in recognizing intellectual, personality and cognitive vulnerabilities, family history and life stressors that contribute to alcohol dependence. Facilitate client understanding of genetic factors, including childhood experiences that led to alcohol dependency and are risk factors for relapse..    Objective #D  Patient will engage in understanding how increasing satisfaction in areas of life can support wellness and improvement in functioning, including recreation, improving current relationships and engaging in new healthy relationships.  Status: New - Date: 6/24/20     Intervention(s)  Therapist will support client in identifying sources of non-drinking recreation and social friendships, using problem-solving, behavioral activation and communication skills to overcome obstacles.  Therapist will teach proactive collaborative communication strategies to reduce  relationship conflict and deepen the relationship partnership and connection to include pleasurable activities, and problem solving.    Objective #E  Patient will use  interpersonal effectiveness, proactive communication, distress tolerance and limit setting skills in relationships with family members . Status: New - Date: 6/24/20     Intervention(s)  Therapist will teach CBT, DBT , and proactive communication skills and practice role plays in sessions.    Patient has not reviewed nor agreed to the above plan.      YUN Steen, LGSW  June 24, 2020  Note reviewed and clinical supervision by YUN Gomez [facility] 6/30/2020

## 2020-08-25 ASSESSMENT — PATIENT HEALTH QUESTIONNAIRE - PHQ9: SUM OF ALL RESPONSES TO PHQ QUESTIONS 1-9: 1

## 2020-08-25 ASSESSMENT — ANXIETY QUESTIONNAIRES: GAD7 TOTAL SCORE: 3

## 2020-09-08 ENCOUNTER — VIRTUAL VISIT (OUTPATIENT)
Dept: PSYCHOLOGY | Facility: CLINIC | Age: 45
End: 2020-09-08
Payer: COMMERCIAL

## 2020-09-08 DIAGNOSIS — F43.23 ADJUSTMENT DISORDER WITH MIXED ANXIETY AND DEPRESSED MOOD: Primary | ICD-10-CM

## 2020-09-08 PROCEDURE — 90834 PSYTX W PT 45 MINUTES: CPT | Mod: 95

## 2020-09-08 NOTE — PROGRESS NOTES
"                                           Progress Note    Patient Name: Derian English  Date: 9/8/2020         Service Type: Individual      Session Start Time: 12:35  Session End Time: 1:20     Session Length: 45    Session #: 6  (Intake appt 3/10/20)    Attendees: Client attended alone    The patient has been notified of the following:      \"We have found that certain health care needs can be provided without the need for a face to face visit.  This service lets us provide the care you need with a phone conversation.       I will have full access to your [place] medical record during this entire phone call.   I will be taking notes for your medical record.      Since this is like an office visit, we will bill your insurance company for this service.       There are potential benefits and risks of telephone visits (e.g. limits to patient confidentiality) that differ from in-person visits.? Confidentiality still applies for telephone services, and nobody will record the visit.  It is important to be in a quiet, private space that is free of distractions (including cell phone or other devices) during the visit.??      If during the course of the call I believe a telephone visit is not appropriate, you will not be charged for this service\"     Consent has been obtained for this service by care team member: Yes     Treatment Plan Last Reviewed: 6/24/20  PHQ-9 / KELVIN-7 :   Answers for HPI/ROS submitted by the patient on 8/24/2020   If you checked off any problems, how difficult have these problems made it for you to do your work, take care of things at home, or get along with other people?: Not difficult at all  PHQ9 TOTAL SCORE: 1  KELVIN 7 TOTAL SCORE: 3    DATA  Interactive Complexity: No  Crisis: No       Progress Since Last Session (Related to Symptoms / Goals / Homework):   Symptoms: Improving. Reduction in symptoms of depression and improvement in sleep.    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 Reinforced minimal alcohol consumption and stepping into stress in household/offering support instead of avoiding feelings and using exercise and connecting with friends for mental health support. Conceptualized ideas for getting time away with wife using childcare sharing with neighbors.     ASSESSMENT: Current Emotional / Mental Status (status of significant symptoms):   Risk status (Self / Other harm or suicidal ideation)   Patient denies current fears or concerns for personal safety.   Patient denies current or recent suicidal ideation or behaviors.   Patient denies current or recent homicidal ideation or behaviors.   Patient denies current or recent self injurious behavior or ideation.   Patient denies other safety concerns.   Patient reports there has been no change in risk factors since their last session.     Patient reports there has been no change in protective factors since their last session.     Recommended that patient call 911 or go to the local ED should there be a change in any of these risk factors.     Appearance:    na-telephone   Eye Contact:    na-telephone   Psychomotor Behavior:  na-telephone   Attitude:   Interested Yosef    Orientation:   All   Speech    Rate / Production: Normal/ Responsive Normal     Volume:  Normal    Mood:    Normal   Affect:    Appropriate    Thought Content:  Clear    Thought Form:  Coherent  Logical     Insight:    Good      Medication Review:   No changes to current psychiatric medication(s)     Medication Compliance:   Yes     Changes in Health Issues:   None reported     Chemical Use Review:  Substance Use: decrease in alcohol. Patient reports frequency of use: 1 or 2 a few times a week.   Tobacco Use: No current tobacco use.      Diagnosis:  1. Adjustment disorder with mixed anxiety and depressed mood        Collateral Reports Completed:   Routed note to PCP    PLAN: (Patient Tasks / Therapist Tasks / Other)  Prioritize connecting with friends and getting exercise. Use DBT SHEA, FAST and GIVE skills for communication with wife.  [email or web address]/worksheets/xuo-zlyxkcawvrzas-cabxrtphbrktu-skills.pdf  Step into struggles instead of away. Team with wife in problem-solving/prioritizing projects and needs.  Prioritize time alone with wife. Get creative.    YUN Steen, [facility] 9/8/2020  Note reviewed and clinical supervision by YUN Gomez [facility] 9/11/2020   ______________________________________________________________________    Treatment Plan    Patient's Name: Derian English  YOB: 1975    Date: 6/24/20    DSM5 Diagnoses: Adjustment Disorders  309.28 (F43.23) With mixed anxiety and depressed mood   Psychosocial / Contextual Factors: limited coping skills/outlets for stress, relational stressors influenced by two working parents  WHODAS:  11     Referral / Collaboration:  Referral to another professional/service is not indicated at this time..    Anticipated number of session or this episode of care: 10      MeasurableTreatment Goal(s) related to diagnosis / functional impairment(s)  Goal 1: Patient will move toward sobriety and acquire the skills to support a reduction in depression and anxiety symptoms and maintain long term sobriety using behavioral and cognitive coping skills and stabilizing physically and emotionally.    I will know I've met my goal when my mood  is better and alcohol is replaced with healthy stuff (paraphrase).      Objective #A (Patient Action)  Patient will explore and resolve ambivalence associated with commitment to change behaviors related to relational communications, substance use and addiction.  Status: New - Date: 6/24/20     Intervention(s)  Therapist will use a nondirective, client-centered empathic style of motivational interviewing, establish rapport with client. Teach proactive communication strategies and role play in session. Have client make a list of the ways substance abuse has negatively impacted his life, and the positive impact non use may have. Ask open-ended questions to explore motivation for change, elicit recognition of gap between current behavior and desired life goals.    Objective #B  Patient will commit self to an action plan directed toward behavior change for wellness to include continued abstinence from alcohol use. Make amends to others who have been impacted by alcohol use.  Status: New - Date: 6/24/20     Intervention(s)  Therapist will encourage client's self efficacy for change in developing an exercise and wellness action plan and abstinence contract; processing client's feelings related to the commitment. Recommend that client attend AA meetings.     Objective #C  Patient will understand and integrate knowledge of alcoholism and the factors that can contribute to the development of chemical dependence and pose risks for relapse.  Status: New - Date: 6/24/20     Intervention(s)  Therapist will support client in recognizing intellectual, personality and cognitive vulnerabilities, family history and life stressors that contribute to alcohol dependence. Facilitate client understanding of genetic factors, including childhood experiences that led to alcohol dependency and are risk factors for relapse..    Objective #D  Patient will engage in understanding how increasing satisfaction in areas of life can support wellness  and improvement in functioning, including recreation, improving current relationships and engaging in new healthy relationships.  Status: New - Date: 6/24/20     Intervention(s)  Therapist will support client in identifying sources of non-drinking recreation and social friendships, using problem-solving, behavioral activation and communication skills to overcome obstacles. Therapist will teach proactive collaborative communication strategies to reduce  relationship conflict and deepen the relationship partnership and connection to include pleasurable activities, and problem solving.    Objective #E  Patient will use  interpersonal effectiveness, proactive communication, distress tolerance and limit setting skills in relationships with family members . Status: New - Date: 6/24/20     Intervention(s)  Therapist will teach CBT, DBT , and proactive communication skills and practice role plays in sessions.    Patient has not reviewed nor agreed to the above plan.      YUN Steen, LGSW  June 24, 2020  Note reviewed and clinical supervision by YUN Gomez [facility] 6/30/2020

## 2020-09-22 ENCOUNTER — VIRTUAL VISIT (OUTPATIENT)
Dept: PSYCHOLOGY | Facility: CLINIC | Age: 45
End: 2020-09-22
Payer: COMMERCIAL

## 2020-09-22 DIAGNOSIS — F43.23 ADJUSTMENT DISORDER WITH MIXED ANXIETY AND DEPRESSED MOOD: Primary | ICD-10-CM

## 2020-09-22 PROCEDURE — 90834 PSYTX W PT 45 MINUTES: CPT | Mod: 95

## 2020-09-22 NOTE — PROGRESS NOTES
Progress Note    Patient Name: Derian English  Date: 9/22/2020         Service Type: Individual      Session Start Time: 12:30  Session End Time: 1:15     Session Length: 45    Session #: 7  (Intake appt 3/10/20)    Attendees: Client attended alone    Telemedicine Visit: The patient's condition can be safely assessed and treated via synchronous audio and visual telemedicine encounter.      Reason for Telemedicine Visit: Services only offered telehealth    Originating Site (Patient Location): Patient's other workplace    Distant Site (Provider Location): Provider Remote Setting home office    Consent:  The patient/guardian has verbally consented to: the potential risks and benefits of telemedicine (video visit) versus in person care; bill my insurance or make self-payment for services provided; and responsibility for payment of non-covered services.     Mode of Communication:  Video Conference via [employer]    As the provider I attest to compliance with applicable laws and regulations related to telemedicine.      Treatment Plan Last Reviewed: 6/24/20  PHQ-9 / KELVIN-7 :   Answers for HPI/ROS submitted by the patient on 8/24/2020   If you checked off any problems, how difficult have these problems made it for you to do your work, take care of things at home, or get along with other people?: Not difficult at all  PHQ9 TOTAL SCORE: 1  KELVIN 7 TOTAL SCORE: 3    DATA  Interactive Complexity: No  Crisis: No       Progress Since Last Session (Related to Symptoms / Goals / Homework):   Symptoms: Improving. Reduction in symptoms of depression and improvement in sleep.    Homework: Partially completed      Episode of Care Goals: Satisfactory progress - ACTION (Actively working towards change); Intervened by reinforcing change plan / affirming steps taken     Current / Ongoing Stressors and Concerns:  Current: Workplace stressors, girl's return to school/balancing chores, preparing  house for potential sale. Reflecting on loss of childhood community member to suicide.     Treatment Objective(s) Addressed in This Session:    Patient will use interpersonal effectiveness, proactive communication, distress tolerance and limit setting skills in relationships     Intervention:  Supportive Therapy: Provided active listening and support. Processed [place] and the nature of support and friendships and the need for different types of friendships.  CBT:  Reviewed thoughts on the value of male friendships; processed past experiences and surfaced hesitation around trying to connect with new people suggested by wife-connected to daughters.    ASSESSMENT: Current Emotional / Mental Status (status of significant symptoms):   Risk status (Self / Other harm or suicidal ideation)   Patient denies current fears or concerns for personal safety.   Patient denies current or recent suicidal ideation or behaviors.   Patient denies current or recent homicidal ideation or behaviors.   Patient denies current or recent self injurious behavior or ideation.   Patient denies other safety concerns.   Patient reports there has been no change in risk factors since their last session.     Patient reports there has been no change in protective factors since their last session.     Recommended that patient call 911 or go to the local ED should there be a change in any of these risk factors.     Appearance:    Approporate   Eye Contact:    Good   Psychomotor Behavior:  Normal   Attitude:   Interested Yosef    Orientation:   All   Speech    Rate / Production: Normal/ Responsive Normal     Volume:  Normal    Mood:    Normal   Affect:    Appropriate    Thought Content:  Clear    Thought Form:  Coherent  Logical    Insight:    Good      Medication Review:   No changes to current psychiatric medication(s)     Medication Compliance:   Yes     Changes in Health Issues:   None  reported     Chemical Use Review:  Substance Use: decrease in alcohol. Patient reports frequency of use: 1 or 2 a few times a week.   Tobacco Use: No current tobacco use.      Diagnosis:  1. Adjustment disorder with mixed anxiety and depressed mood        Collateral Reports Completed:   Routed note to PCP    PLAN: (Patient Tasks / Therapist Tasks / Other)  Prioritize connecting with friends and getting exercise. Use DBT SHEA, FAST and GIVE skills for communication with wife.  https://www.[employer]/worksheets/tzb-cjtcntxbvqeau-ssjsvbciyqrzm-skills.pdf  Step into struggles instead of away. Team with wife in problem-solving/prioritizing projects and needs.  Prioritize time alone with wife. Get creative.    YUN Steen, [facility] 9/22/2020  Note reviewed and clinical supervision by YUN Gomez [facility] 9/28/2020    ______________________________________________________________________    Treatment Plan    Patient's Name: Derian English  YOB: 1975    Date: 6/24/20    DSM5 Diagnoses: Adjustment Disorders  309.28 (F43.23) With mixed anxiety and depressed mood   Psychosocial / Contextual Factors: limited coping skills/outlets for stress, relational stressors influenced by two working parents  WHODAS:  11     Referral / Collaboration:  Referral to another professional/service is not indicated at this time..    Anticipated number of session or this episode of care: 10      MeasurableTreatment Goal(s) related to diagnosis / functional impairment(s)  Goal 1: Patient will move toward sobriety and acquire the skills to support a reduction in depression and anxiety symptoms and maintain long term sobriety using behavioral and cognitive coping skills and stabilizing physically and emotionally.    I will know I've met my goal when my mood is better and alcohol is replaced with healthy stuff (paraphrase).      Objective #A (Patient Action)  Patient will explore and resolve ambivalence associated  with commitment to change behaviors related to relational communications, substance use and addiction.  Status: New - Date: 6/24/20     Intervention(s)  Therapist will use a nondirective, client-centered empathic style of motivational interviewing, establish rapport with client. Teach proactive communication strategies and role play in session. Have client make a list of the ways substance abuse has negatively impacted his life, and the positive impact non use may have. Ask open-ended questions to explore motivation for change, elicit recognition of gap between current behavior and desired life goals.    Objective #B  Patient will commit self to an action plan directed toward behavior change for wellness to include continued abstinence from alcohol use. Make amends to others who have been impacted by alcohol use.  Status: New - Date: 6/24/20     Intervention(s)  Therapist will encourage client's self efficacy for change in developing an exercise and wellness action plan and abstinence contract; processing client's feelings related to the commitment. Recommend that client attend AA meetings.     Objective #C  Patient will understand and integrate knowledge of alcoholism and the factors that can contribute to the development of chemical dependence and pose risks for relapse.  Status: New - Date: 6/24/20     Intervention(s)  Therapist will support client in recognizing intellectual, personality and cognitive vulnerabilities, family history and life stressors that contribute to alcohol dependence. Facilitate client understanding of genetic factors, including childhood experiences that led to alcohol dependency and are risk factors for relapse..    Objective #D  Patient will engage in understanding how increasing satisfaction in areas of life can support wellness and improvement in functioning, including recreation, improving current relationships and engaging in new healthy relationships.  Status: New - Date: 6/24/20      Intervention(s)  Therapist will support client in identifying sources of non-drinking recreation and social friendships, using problem-solving, behavioral activation and communication skills to overcome obstacles. Therapist will teach proactive collaborative communication strategies to reduce  relationship conflict and deepen the relationship partnership and connection to include pleasurable activities, and problem solving.    Objective #E  Patient will use  interpersonal effectiveness, proactive communication, distress tolerance and limit setting skills in relationships with family members . Status: New - Date: 6/24/20     Intervention(s)  Therapist will teach CBT, DBT , and proactive communication skills and practice role plays in sessions.    Patient has not reviewed nor agreed to the above plan.      YUN Steen, LGSW  June 24, 2020  Note reviewed and clinical supervision by YUN Gomez [facility] 6/30/2020

## 2020-10-12 ENCOUNTER — VIRTUAL VISIT (OUTPATIENT)
Dept: PSYCHOLOGY | Facility: CLINIC | Age: 45
End: 2020-10-12
Payer: COMMERCIAL

## 2020-10-12 DIAGNOSIS — F43.23 ADJUSTMENT DISORDER WITH MIXED ANXIETY AND DEPRESSED MOOD: Primary | ICD-10-CM

## 2020-10-12 PROCEDURE — 90834 PSYTX W PT 45 MINUTES: CPT | Mod: 95

## 2020-10-12 ASSESSMENT — ANXIETY QUESTIONNAIRES
4. TROUBLE RELAXING: SEVERAL DAYS
2. NOT BEING ABLE TO STOP OR CONTROL WORRYING: NOT AT ALL
GAD7 TOTAL SCORE: 3
7. FEELING AFRAID AS IF SOMETHING AWFUL MIGHT HAPPEN: NOT AT ALL
1. FEELING NERVOUS, ANXIOUS, OR ON EDGE: NOT AT ALL
3. WORRYING TOO MUCH ABOUT DIFFERENT THINGS: NOT AT ALL
GAD7 TOTAL SCORE: 3
GAD7 TOTAL SCORE: 3
6. BECOMING EASILY ANNOYED OR IRRITABLE: SEVERAL DAYS
5. BEING SO RESTLESS THAT IT IS HARD TO SIT STILL: SEVERAL DAYS
7. FEELING AFRAID AS IF SOMETHING AWFUL MIGHT HAPPEN: NOT AT ALL

## 2020-10-12 ASSESSMENT — PATIENT HEALTH QUESTIONNAIRE - PHQ9
10. IF YOU CHECKED OFF ANY PROBLEMS, HOW DIFFICULT HAVE THESE PROBLEMS MADE IT FOR YOU TO DO YOUR WORK, TAKE CARE OF THINGS AT HOME, OR GET ALONG WITH OTHER PEOPLE: NOT DIFFICULT AT ALL
SUM OF ALL RESPONSES TO PHQ QUESTIONS 1-9: 3
SUM OF ALL RESPONSES TO PHQ QUESTIONS 1-9: 3

## 2020-10-12 NOTE — PROGRESS NOTES
Progress Note    Patient Name: Derian English  Date: 10/12/2020         Service Type: Individual      Session Start Time: 10:30  Session End Time: 11:15     Session Length: 45    Session #: 8  (Intake appt 3/10/20)    Attendees: Client attended alone    Telemedicine Visit: The patient's condition can be safely assessed and treated via synchronous audio and visual telemedicine encounter.      Reason for Telemedicine Visit: Services only offered telehealth    Originating Site (Patient Location): Patient's other workplace    Distant Site (Provider Location): Provider Remote Setting home office    Consent:  The patient/guardian has verbally consented to: the potential risks and benefits of telemedicine (video visit) versus in person care; bill my insurance or make self-payment for services provided; and responsibility for payment of non-covered services.     Mode of Communication:  Video Conference via [employer]    As the provider I attest to compliance with applicable laws and regulations related to telemedicine.      Treatment Plan Last Reviewed: 6/24/20  PHQ-9 / KELVIN-7 :   Answers for HPI/ROS submitted by the patient on 10/12/2020   If you checked off any problems, how difficult have these problems made it for you to do your work, take care of things at home, or get along with other people?: Not difficult at all  PHQ9 TOTAL SCORE: 3  KELVIN 7 TOTAL SCORE: 3      DATA  Interactive Complexity: No  Crisis: No       Progress Since Last Session (Related to Symptoms / Goals / Homework):   Symptoms: Improving. Reduction in symptoms of depression and improvement in sleep.    Homework: Partially completed      Episode of Care Goals: Satisfactory progress - ACTION (Actively working towards change); Intervened by reinforcing change plan / affirming steps taken     Current / Ongoing Stressors and Concerns:  Current: Workplace stressors, girls back at school/balancing chores, preparing  house for potential sale. Reflecting on loss of childhood community member to suicide.     Treatment Objective(s) Addressed in This Session:    Patient will use interpersonal effectiveness, proactive communication, distress tolerance and limit setting skills in relationships     Intervention:  CBT:  Reviewed progress on developing male friendships, noting enjoying meeting some new dads. Explored value of connection on successes and struggles; conceptualized opportunity for home integration.  Supportive Therapy: Provided active listening and support. Reinforced positive behavioral choices. Reflected on the progress and gains client has made since entering into counseling, as well as the transitions and shifts that have occurred in life during that time.      ASSESSMENT: Current Emotional / Mental Status (status of significant symptoms):   Risk status (Self / Other harm or suicidal ideation)   Patient denies current fears or concerns for personal safety.   Patient denies current or recent suicidal ideation or behaviors.   Patient denies current or recent homicidal ideation or behaviors.   Patient denies current or recent self injurious behavior or ideation.   Patient denies other safety concerns.   Patient reports there has been no change in risk factors since their last session.     Patient reports there has been no change in protective factors since their last session.     Recommended that patient call 911 or go to the local ED should there be a change in any of these risk factors.     Appearance:    Approporate   Eye Contact:    Good   Psychomotor Behavior:  Normal   Attitude:   Interested Yosef    Orientation:   All   Speech    Rate / Production: Normal/ Responsive Normal     Volume:  Normal    Mood:    Normal   Affect:    Appropriate    Thought Content:  Clear    Thought Form:  Coherent  Logical    Insight:    Good      Medication Review:   No changes to current psychiatric medication(s)     Medication  Compliance:   Yes     Changes in Health Issues:   None reported     Chemical Use Review:  Substance Use: decrease in alcohol. Patient reports frequency of use: 1 or 2 a few times a week.   Tobacco Use: No current tobacco use.      Diagnosis:  1. Adjustment disorder with mixed anxiety and depressed mood      Collateral Reports Completed:   Routed note to PCP    PLAN: (Patient Tasks / Therapist Tasks / Other)  Prioritize connecting with friends and getting exercise. Use DBT SHEA, FAST and GIVE skills for communication with wife.  Step into struggles instead of away. Team with wife in problem-solving/prioritizing projects and needs.  Prioritize time alone with wife. Get creative. Use intentional communication to connect with daughters and wife. (Samira/Bud/Thorn example).    YUN Steen, LGSW 10/12/20  Note reviewed and clinical supervision by YUN Gomez [facility] 10/14/2020   ______________________________________________________________________    Treatment Plan    Patient's Name: Derian English  YOB: 1975    Date: 6/24/20    DSM5 Diagnoses: Adjustment Disorders  309.28 (F43.23) With mixed anxiety and depressed mood   Psychosocial / Contextual Factors: limited coping skills/outlets for stress, relational stressors influenced by two working parents  WHODAS:  11     Referral / Collaboration:  Referral to another professional/service is not indicated at this time..    Anticipated number of session or this episode of care: 10      MeasurableTreatment Goal(s) related to diagnosis / functional impairment(s)  Goal 1: Patient will move toward sobriety and acquire the skills to support a reduction in depression and anxiety symptoms and maintain long term sobriety using behavioral and cognitive coping skills and stabilizing physically and emotionally.    I will know I've met my goal when my mood is better and alcohol is replaced with healthy stuff (paraphrase).      Objective #A (Patient  Action)  Patient will explore and resolve ambivalence associated with commitment to change behaviors  related to relational communications, substance use and addiction.   Status: New - Date: 6/24/20    10/12/20    Intervention(s)  Therapist will use a nondirective, client-centered empathic style of motivational interviewing, establish rapport with client. Teach proactive communication strategies and role play in session. Have client make a list of the ways substance abuse has negatively impacted his life, and the positive impact non use may have. Ask open-ended questions to explore motivation for change, elicit recognition of gap between current behavior and desired life goals.    Objective #B  Patient will commit self to an action plan directed toward behavior change for wellness to include continued abstinence from alcohol use. Make amends to others who have been impacted by alcohol use.   Status: New - Date: 6/24/20   10/12/20    Intervention(s)  Therapist will encourage client's self efficacy for change in developing an exercise and wellness action plan and abstinence contract; processing client's feelings related to the commitment. Recommend that client attend AA meetings.     Objective #C  Patient will understand and integrate knowledge of alcoholism and the factors that can contribute to the development of chemical dependence and pose risks for relapse.   Status: New - Date: 6/24/20   10/12/20    Intervention(s)  Therapist will support client in recognizing intellectual, personality and cognitive vulnerabilities, family history and life stressors that contribute to alcohol dependence. Facilitate client understanding of genetic factors, including childhood experiences that led to alcohol dependency and are risk factors for relapse..    Objective #D  Patient will engage in understanding how increasing satisfaction in areas of life can support wellness and improvement in functioning, including recreation,  improving current relationships and engaging in new healthy relationships.   Status: New - Date: 6/24/20   10/12/20    Intervention(s)  Therapist will support client in identifying sources of non-drinking recreation and social friendships, using problem-solving, behavioral activation and communication skills to overcome obstacles. Therapist will teach proactive collaborative communication strategies to reduce  relationship conflict and deepen the relationship partnership and connection to include pleasurable activities, and problem solving.    Objective #E   Patient will use  interpersonal effectiveness, proactive communication, distress tolerance and limit  setting skills in relationships with family members .    Status: New - Date: 6/24/20   10/12/20    Intervention(s)  Therapist will teach CBT, DBT , and proactive communication skills and practice role plays in sessions.    Patient has not reviewed nor agreed to the above plan.      YUN Steen, [facility]  June 24, 2020   10/12/20  Note reviewed and clinical supervision by YUN Gomez [facility] 10/14/2020

## 2020-10-13 ASSESSMENT — PATIENT HEALTH QUESTIONNAIRE - PHQ9: SUM OF ALL RESPONSES TO PHQ QUESTIONS 1-9: 3

## 2020-10-13 ASSESSMENT — ANXIETY QUESTIONNAIRES: GAD7 TOTAL SCORE: 3

## 2020-11-17 ENCOUNTER — VIRTUAL VISIT (OUTPATIENT)
Dept: PSYCHOLOGY | Facility: CLINIC | Age: 45
End: 2020-11-17
Payer: COMMERCIAL

## 2020-11-17 DIAGNOSIS — F43.23 ADJUSTMENT DISORDER WITH MIXED ANXIETY AND DEPRESSED MOOD: Primary | ICD-10-CM

## 2020-11-17 PROCEDURE — 90834 PSYTX W PT 45 MINUTES: CPT | Mod: 95

## 2020-11-17 NOTE — PROGRESS NOTES
Progress Note    Patient Name: Derian English  Date: 10/12/2020         Service Type: Individual      Session Start Time: 10:30  Session End Time: 11:15     Session Length: 45    Session #: 8  (Intake appt 3/10/20)    Attendees: Client attended alone    Telemedicine Visit: The patient's condition can be safely assessed and treated via synchronous audio and visual telemedicine encounter.      Reason for Telemedicine Visit: Services only offered telehealth    Originating Site (Patient Location): Patient's other workplace    Distant Site (Provider Location): Provider Remote Setting home office    Consent:  The patient/guardian has verbally consented to: the potential risks and benefits of telemedicine (video visit) versus in person care; bill my insurance or make self-payment for services provided; and responsibility for payment of non-covered services.     Mode of Communication:  Video Conference via [employer]    As the provider I attest to compliance with applicable laws and regulations related to telemedicine.    Treatment Plan Last Reviewed: 10/12/20  PHQ-9 / KELVIN-7 :   Answers for HPI/ROS submitted by the patient on 10/12/2020   If you checked off any problems, how difficult have these problems made it for you to do your work, take care of things at home, or get along with other people?: Not difficult at all  PHQ9 TOTAL SCORE: 3  KELVIN 7 TOTAL SCORE: 3    DATA  Interactive Complexity: No  Crisis: No       Progress Since Last Session (Related to Symptoms / Goals / Homework):   Symptoms: Improving. Reduction in symptoms of depression and improvement in sleep. More proactive communication.    Homework: Partially completed      Episode of Care Goals: Satisfactory progress - ACTION (Actively working towards change); Intervened by reinforcing change plan / affirming steps taken     Current / Ongoing Stressors and Concerns:  Current: Workplace stressors, girls back at  school/balancing chores, preparing house for potential sale. Reflecting on loss of childhood community member to suicide.     Treatment Objective(s) Addressed in This Session:    Patient will use interpersonal effectiveness, proactive communication, distress tolerance and limit setting skills in relationships     Intervention:  CBT:  Processed experience of navigating uncertainty related to coronavirus and using proactive communication to problem solve solutions to safely seeing family members. Validated efforts around seeking connection with wife.  Supportive Therapy: Provided active listening and support. Reinforced positive behavioral choices. Reflected on the progress and gains client has made since entering into counseling, as well as the transitions and shifts that have occurred in life during that time..      ASSESSMENT: Current Emotional / Mental Status (status of significant symptoms):   Risk status (Self / Other harm or suicidal ideation)   Patient denies current fears or concerns for personal safety.   Patient denies current or recent suicidal ideation or behaviors.   Patient denies current or recent homicidal ideation or behaviors.   Patient denies current or recent self injurious behavior or ideation.   Patient denies other safety concerns.   Patient reports there has been no change in risk factors since their last session.     Patient reports there has been no change in protective factors since their last session.     Recommended that patient call 911 or go to the local ED should there be a change in any of these risk factors.     Appearance:    Approporate   Eye Contact:    Good   Psychomotor Behavior:  Normal   Attitude:   Interested Yosef    Orientation:   All   Speech    Rate / Production: Normal/ Responsive Normal     Volume:  Normal    Mood:    Normal   Affect:    Appropriate    Thought Content:  Clear    Thought Form:  Coherent  Logical    Insight:    Good      Medication Review:   No changes to  current psychiatric medication(s)     Medication Compliance:   Yes     Changes in Health Issues:   None reported     Chemical Use Review:  Substance Use: decrease in alcohol. Patient reports frequency of use: 1 or 2 a few times a week.   Tobacco Use: No current tobacco use.      Diagnosis:  1. Adjustment disorder with mixed anxiety and depressed mood      Collateral Reports Completed:   Routed note to PCP    PLAN: (Patient Tasks / Therapist Tasks / Other)  Prioritize connecting with friends and getting exercise. Use DBT SHEA, FAST and GIVE skills for communication with wife.  Step into struggles with family instead of away. Team with wife in problem-solving/prioritizing projects and needs. Use intentional communication and actions to connect with daughters and wife..    YUN Steen, [facility] 11/17/20  Note reviewed and clinical supervision by YUN Gomez [facility] 11/20/2020   ______________________________________________________________________    Treatment Plan    Patient's Name: Derian English  YOB: 1975    Date: 6/24/20   10/12/20    DSM5 Diagnoses: Adjustment Disorders  309.28 (F43.23) With mixed anxiety and depressed mood   Psychosocial / Contextual Factors: limited coping skills/outlets for stress, relational stressors influenced by two working parents  WHODAS:  11     Referral / Collaboration:  Referral to another professional/service is not indicated at this time..    Anticipated number of session or this episode of care: 10      MeasurableTreatment Goal(s) related to diagnosis / functional impairment(s)  Goal 1: Patient will move toward sobriety and acquire the skills to support a reduction in depression and anxiety symptoms and maintain long term sobriety using behavioral and cognitive coping skills and stabilizing physically and emotionally.    I will know I've met my goal when my mood is better and alcohol is replaced with healthy stuff (paraphrase).      Objective #A  (Patient Action)  Patient will explore and resolve ambivalence associated with commitment to change behaviors  related to relational communications, substance use and addiction.   Status: New - Date: 6/24/20    10/12/20    Intervention(s)  Therapist will use a nondirective, client-centered empathic style of motivational interviewing, establish rapport with client. Teach proactive communication strategies and role play in session. Have client make a list of the ways substance abuse has negatively impacted his life, and the positive impact non use may have. Ask open-ended questions to explore motivation for change, elicit recognition of gap between current behavior and desired life goals.    Objective #B  Patient will commit self to an action plan directed toward behavior change for wellness to include continued abstinence from alcohol use. Make amends to others who have been impacted by alcohol use.   Status: New - Date: 6/24/20   10/12/20    Intervention(s)  Therapist will encourage client's self efficacy for change in developing an exercise and wellness action plan and abstinence contract; processing client's feelings related to the commitment. Recommend that client attend AA meetings.     Objective #C  Patient will understand and integrate knowledge of alcoholism and the factors that can contribute to the development of chemical dependence and pose risks for relapse.   Status: New - Date: 6/24/20   10/12/20    Intervention(s)  Therapist will support client in recognizing intellectual, personality and cognitive vulnerabilities, family history and life stressors that contribute to alcohol dependence. Facilitate client understanding of genetic factors, including childhood experiences that led to alcohol dependency and are risk factors for relapse..    Objective #D  Patient will engage in understanding how increasing satisfaction in areas of life can support wellness and improvement in functioning, including  recreation, improving current relationships and engaging in new healthy relationships.   Status: New - Date: 6/24/20   10/12/20    Intervention(s)  Therapist will support client in identifying sources of non-drinking recreation and social friendships, using problem-solving, behavioral activation and communication skills to overcome obstacles. Therapist will teach proactive collaborative communication strategies to reduce  relationship conflict and deepen the relationship partnership and connection to include pleasurable activities, and problem solving.    Objective #E   Patient will use  interpersonal effectiveness, proactive communication, distress tolerance and limit  setting skills in relationships with family members .    Status: New - Date: 6/24/20   10/12/20    Intervention(s)  Therapist will teach CBT, DBT , and proactive communication skills and practice role plays in sessions.    Patient has not reviewed nor agreed to the above plan.      YUN Steen, [facility]  June 24, 2020   10/12/20  Note reviewed and clinical supervision by YUN Gomez [facility] 10/14/2020

## 2020-12-14 ENCOUNTER — HEALTH MAINTENANCE LETTER (OUTPATIENT)
Age: 45
End: 2020-12-14

## 2021-03-04 ENCOUNTER — VIRTUAL VISIT (OUTPATIENT)
Dept: PSYCHOLOGY | Facility: CLINIC | Age: 46
End: 2021-03-04
Payer: COMMERCIAL

## 2021-03-04 DIAGNOSIS — F43.23 ADJUSTMENT DISORDER WITH MIXED ANXIETY AND DEPRESSED MOOD: Primary | ICD-10-CM

## 2021-03-04 PROCEDURE — 90834 PSYTX W PT 45 MINUTES: CPT | Mod: 95

## 2021-03-04 NOTE — PROGRESS NOTES
Progress Note    Patient Name: Derian English  Date: 3/4/2021         Service Type: Individual      Session Start Time: 8:30a  Session End Time: 9:15a     Session Length: 45    Session #: 9  (Intake appt 3/10/20)    Attendees: Client attended alone    Telemedicine Visit: The patient's condition can be safely assessed and treated via synchronous audio and visual telemedicine encounter.      Reason for Telemedicine Visit: Services only offered telehealth    Originating Site (Patient Location): Patient's other workplace    Distant Site (Provider Location): Provider Remote Setting home office    Consent:  The patient/guardian has verbally consented to: the potential risks and benefits of telemedicine (video visit) versus in person care; bill my insurance or make self-payment for services provided; and responsibility for payment of non-covered services.     Mode of Communication:  Video Conference via [employer]    As the provider I attest to compliance with applicable laws and regulations related to telemedicine.    Treatment Plan Last Reviewed: 3/4/21  PHQ-9 / KELVIN-7 : 3 /3     DATA  Interactive Complexity: No  Crisis: No       Progress Since Last Session (Related to Symptoms / Goals / Homework):   Symptoms: Improving. Reduction in symptoms of depression and improvement in sleep. More proactive communication.    Homework: Partially completed      Episode of Care Goals: Satisfactory progress - ACTION (Actively working towards change); Intervened by reinforcing change plan / affirming steps taken     Current / Ongoing Stressors and Concerns:  Current: Workplace staff shortage stressors, girls back at school/balancing chores.     Treatment Objective(s) Addressed in This Session:    Patient will use interpersonal effectiveness, proactive communication, distress tolerance and limit setting skills in relationships     Intervention:  CBT:  Processed experience of using proactive  communication to get needs met at work and home. Surfaced need for boundaries related to support and role played in session.   Supportive Therapy: Provided active listening and support. Reinforced positive behavioral choices. Reflected on the progress and gains client has made since entering into counseling, as well as the transitions and shifts that have occurred in life during that time..      ASSESSMENT: Current Emotional / Mental Status (status of significant symptoms):   Risk status (Self / Other harm or suicidal ideation)   Patient denies current fears or concerns for personal safety.   Patient denies current or recent suicidal ideation or behaviors.   Patient denies current or recent homicidal ideation or behaviors.   Patient denies current or recent self injurious behavior or ideation.   Patient denies other safety concerns.   Patient reports there has been no change in risk factors since their last session.     Patient reports there has been no change in protective factors since their last session.     Recommended that patient call 911 or go to the local ED should there be a change in any of these risk factors.     Appearance:    Approporate   Eye Contact:    Good   Psychomotor Behavior:  Normal   Attitude:   Interested Yosef    Orientation:   All   Speech    Rate / Production: Normal/ Responsive Normal     Volume:  Normal    Mood:    Normal   Affect:    Appropriate    Thought Content:  Clear    Thought Form:  Coherent  Logical    Insight:    Good      Medication Review:   No changes to current psychiatric medication(s)     Medication Compliance:   Yes     Changes in Health Issues:   None reported     Chemical Use Review:  Substance Use: decrease in alcohol.    Tobacco Use: No current tobacco use.      Diagnosis:  1. Adjustment disorder with mixed anxiety and depressed mood      Collateral Reports Completed:   Routed note to PCP    PLAN: (Patient Tasks / Therapist Tasks / Other)  Prioritize connecting with  friends and getting exercise. Use DBT SHEA, FAST and GIVE skills for communication with wife.  Step into struggles with family instead of away. Team with wife in problem-solving/prioritizing projects and needs. Use proactive communication and actions to connect with boss, daughters and wife.    YUN Steen, LGSW   3/4/21  This note has been reviewed and I agree with the plan of care. This note is co-signed by YUN Rivas, [facility], Supervisor, on: 3/10/21      ______________________________________________________________________    Treatment Plan    Patient's Name: Derian English  YOB: 1975    Date: 6/24/20   10/12/20  3/4/21    DSM5 Diagnoses: Adjustment Disorders  309.28 (F43.23) With mixed anxiety and depressed mood   Psychosocial / Contextual Factors: limited coping skills/outlets for stress, relational stressors influenced by two working parents  WHODAS:  11     Referral / Collaboration:  Referral to another professional/service is not indicated at this time..    Anticipated number of session or this episode of care: 10      MeasurableTreatment Goal(s) related to diagnosis / functional impairment(s)  Goal 1: Patient will move toward sobriety and acquire the skills to support a reduction in depression and anxiety symptoms and maintain long term sobriety using behavioral and cognitive coping skills and stabilizing physically and emotionally.    I will know I've met my goal when my mood is better and alcohol is replaced with healthy stuff (paraphrase).      Objective #A (Patient Action)  Patient will explore and resolve ambivalence associated with commitment to change behaviors  Related to relational communications, substance use and addiction.   Status: New - Date: 6/24/20    10/12/20  3/4/21    Intervention(s)  Therapist will use a nondirective, client-centered empathic style of motivational interviewing, establish rapport with client. Teach proactive communication strategies and  role play in session. Have client make a list of the ways substance abuse has negatively impacted his life, and the positive impact non use may have. Ask open-ended questions to explore motivation for change, elicit recognition of gap between current behavior and desired life goals.    Objective #B  Patient will commit self to an action plan directed toward behavior change for wellness to include continued abstinence from alcohol use. Make amends to others who have been impacted by alcohol use.   Status: New - Date: 6/24/20   10/12/20  3/4/21    Intervention(s)  Therapist will encourage client's self efficacy for change in developing an exercise and wellness action plan and abstinence contract; processing client's feelings related to the commitment. Recommend that client attend AA meetings.     Objective #C  Patient will understand and integrate knowledge of alcoholism and the factors that can contribute to the development of chemical dependence and pose risks for relapse.   Status: New - Date: 6/24/20   10/12/20   3/4/21    Intervention(s)  Therapist will support client in recognizing intellectual, personality and cognitive vulnerabilities, family history and life stressors that contribute to alcohol dependence. Facilitate client understanding of genetic factors, including childhood experiences that led to alcohol dependency and are risk factors for relapse..    Objective #D  Patient will engage in understanding how increasing satisfaction in areas of life can support wellness and improvement in functioning, including recreation, improving current relationships and engaging in new healthy relationships.   Status: New - Date: 6/24/20   10/12/20   3/4/21    Intervention(s)  Therapist will support client in identifying sources of non-drinking recreation and social friendships, using problem-solving, behavioral activation and communication skills to overcome obstacles. Therapist will teach proactive collaborative  communication strategies to reduce  relationship conflict and deepen the relationship partnership and connection to include pleasurable activities, and problem solving.    Objective #E   Patient will use  interpersonal effectiveness, proactive communication, distress tolerance and limit  setting skills in relationships with family members .    Status: New - Date: 6/24/20   10/12/20    3/4/21    Intervention(s)  Therapist will teach CBT, DBT , and proactive communication skills and practice role plays in sessions.    Patient has not reviewed nor agreed to the above plan.      YUN Steen, [facility]  June 24, 2020   10/12/20   3/4/21  Note reviewed and clinical supervision by YUN Gomez [facility] 10/14/2020

## 2021-04-18 ENCOUNTER — HEALTH MAINTENANCE LETTER (OUTPATIENT)
Age: 46
End: 2021-04-18

## 2021-10-02 ENCOUNTER — HEALTH MAINTENANCE LETTER (OUTPATIENT)
Age: 46
End: 2021-10-02

## 2022-05-14 ENCOUNTER — HEALTH MAINTENANCE LETTER (OUTPATIENT)
Age: 47
End: 2022-05-14

## 2022-09-03 ENCOUNTER — HEALTH MAINTENANCE LETTER (OUTPATIENT)
Age: 47
End: 2022-09-03

## 2023-06-03 ENCOUNTER — HEALTH MAINTENANCE LETTER (OUTPATIENT)
Age: 48
End: 2023-06-03

## 2023-10-04 ENCOUNTER — OFFICE VISIT (OUTPATIENT)
Dept: FAMILY MEDICINE | Facility: CLINIC | Age: 48
End: 2023-10-04
Payer: COMMERCIAL

## 2023-10-04 VITALS
SYSTOLIC BLOOD PRESSURE: 115 MMHG | WEIGHT: 189.8 LBS | RESPIRATION RATE: 15 BRPM | HEART RATE: 52 BPM | DIASTOLIC BLOOD PRESSURE: 77 MMHG | BODY MASS INDEX: 28.76 KG/M2 | OXYGEN SATURATION: 98 % | HEIGHT: 68 IN | TEMPERATURE: 98.9 F

## 2023-10-04 DIAGNOSIS — Z11.59 NEED FOR HEPATITIS C SCREENING TEST: ICD-10-CM

## 2023-10-04 DIAGNOSIS — Z12.11 SCREEN FOR COLON CANCER: ICD-10-CM

## 2023-10-04 DIAGNOSIS — K85.20 ALCOHOL-INDUCED ACUTE PANCREATITIS WITHOUT INFECTION OR NECROSIS: Primary | ICD-10-CM

## 2023-10-04 DIAGNOSIS — Z13.1 SCREENING FOR DIABETES MELLITUS: ICD-10-CM

## 2023-10-04 DIAGNOSIS — Z13.220 SCREENING CHOLESTEROL LEVEL: ICD-10-CM

## 2023-10-04 DIAGNOSIS — F10.29 ALCOHOL DEPENDENCE WITH UNSPECIFIED ALCOHOL-INDUCED DISORDER (H): ICD-10-CM

## 2023-10-04 DIAGNOSIS — Z11.4 SCREENING FOR HIV (HUMAN IMMUNODEFICIENCY VIRUS): ICD-10-CM

## 2023-10-04 LAB
BASO+EOS+MONOS # BLD AUTO: ABNORMAL 10*3/UL
BASO+EOS+MONOS NFR BLD AUTO: ABNORMAL %
BASOPHILS # BLD AUTO: 0.1 10E3/UL (ref 0–0.2)
BASOPHILS NFR BLD AUTO: 1 %
EOSINOPHIL # BLD AUTO: 0.1 10E3/UL (ref 0–0.7)
EOSINOPHIL NFR BLD AUTO: 1 %
ERYTHROCYTE [DISTWIDTH] IN BLOOD BY AUTOMATED COUNT: 12.8 % (ref 10–15)
HBA1C MFR BLD: 6 % (ref 0–5.6)
HCT VFR BLD AUTO: 39.3 % (ref 40–53)
HGB BLD-MCNC: 12.9 G/DL (ref 13.3–17.7)
IMM GRANULOCYTES # BLD: 0 10E3/UL
IMM GRANULOCYTES NFR BLD: 0 %
INR PPP: 1.1 (ref 0.85–1.15)
LYMPHOCYTES # BLD AUTO: 2 10E3/UL (ref 0.8–5.3)
LYMPHOCYTES NFR BLD AUTO: 29 %
MCH RBC QN AUTO: 30.4 PG (ref 26.5–33)
MCHC RBC AUTO-ENTMCNC: 32.8 G/DL (ref 31.5–36.5)
MCV RBC AUTO: 93 FL (ref 78–100)
MONOCYTES # BLD AUTO: 0.6 10E3/UL (ref 0–1.3)
MONOCYTES NFR BLD AUTO: 9 %
NEUTROPHILS # BLD AUTO: 4.2 10E3/UL (ref 1.6–8.3)
NEUTROPHILS NFR BLD AUTO: 60 %
PLATELET # BLD AUTO: 482 10E3/UL (ref 150–450)
RBC # BLD AUTO: 4.25 10E6/UL (ref 4.4–5.9)
WBC # BLD AUTO: 7 10E3/UL (ref 4–11)

## 2023-10-04 PROCEDURE — 83036 HEMOGLOBIN GLYCOSYLATED A1C: CPT | Performed by: FAMILY MEDICINE

## 2023-10-04 PROCEDURE — 90686 IIV4 VACC NO PRSV 0.5 ML IM: CPT | Performed by: FAMILY MEDICINE

## 2023-10-04 PROCEDURE — 80053 COMPREHEN METABOLIC PANEL: CPT | Performed by: FAMILY MEDICINE

## 2023-10-04 PROCEDURE — 36415 COLL VENOUS BLD VENIPUNCTURE: CPT | Performed by: FAMILY MEDICINE

## 2023-10-04 PROCEDURE — 82977 ASSAY OF GGT: CPT | Performed by: FAMILY MEDICINE

## 2023-10-04 PROCEDURE — 86803 HEPATITIS C AB TEST: CPT | Performed by: FAMILY MEDICINE

## 2023-10-04 PROCEDURE — 90715 TDAP VACCINE 7 YRS/> IM: CPT | Performed by: FAMILY MEDICINE

## 2023-10-04 PROCEDURE — 80061 LIPID PANEL: CPT | Performed by: FAMILY MEDICINE

## 2023-10-04 PROCEDURE — 91320 SARSCV2 VAC 30MCG TRS-SUC IM: CPT | Performed by: FAMILY MEDICINE

## 2023-10-04 PROCEDURE — 90471 IMMUNIZATION ADMIN: CPT | Performed by: FAMILY MEDICINE

## 2023-10-04 PROCEDURE — 90480 ADMN SARSCOV2 VAC 1/ONLY CMP: CPT | Performed by: FAMILY MEDICINE

## 2023-10-04 PROCEDURE — 85025 COMPLETE CBC W/AUTO DIFF WBC: CPT | Performed by: FAMILY MEDICINE

## 2023-10-04 PROCEDURE — 87389 HIV-1 AG W/HIV-1&-2 AB AG IA: CPT | Performed by: FAMILY MEDICINE

## 2023-10-04 PROCEDURE — 90472 IMMUNIZATION ADMIN EACH ADD: CPT | Performed by: FAMILY MEDICINE

## 2023-10-04 PROCEDURE — 83690 ASSAY OF LIPASE: CPT | Performed by: FAMILY MEDICINE

## 2023-10-04 PROCEDURE — 85610 PROTHROMBIN TIME: CPT | Performed by: FAMILY MEDICINE

## 2023-10-04 PROCEDURE — 99204 OFFICE O/P NEW MOD 45 MIN: CPT | Mod: 25 | Performed by: FAMILY MEDICINE

## 2023-10-04 NOTE — NURSING NOTE
Prior to immunization administration, verified patients identity using patient s name and date of birth. Please see Immunization Activity for additional information. Yes    Screening Questionnaire for Adult Immunization    Are you sick today?   No   Do you have allergies to medications, food, a vaccine component or latex?   No   Have you ever had a serious reaction after receiving a vaccination?   No   Do you have a long-term health problem with heart, lung, kidney, or metabolic disease (e.g., diabetes), asthma, a blood disorder, no spleen, complement component deficiency, a cochlear implant, or a spinal fluid leak?  Are you on long-term aspirin therapy?   No   Do you have cancer, leukemia, HIV/AIDS, or any other immune system problem?   No   Do you have a parent, brother, or sister with an immune system problem?   No   In the past 3 months, have you taken medications that affect  your immune system, such as prednisone, other steroids, or anticancer drugs; drugs for the treatment of rheumatoid arthritis, Crohn s disease, or psoriasis; or have you had radiation treatments?   No   Have you had a seizure, or a brain or other nervous system problem?   No   During the past year, have you received a transfusion of blood or blood    products, or been given immune (gamma) globulin or antiviral drug?   No   For women: Are you pregnant or is there a chance you could become       pregnant during the next month?   N/A   Have you received any vaccinations in the past 4 weeks?   No     Immunization questionnaire answers were all negative.      Patient instructed to remain in clinic for 15 minutes afterwards, and to report any adverse reactions.     Screening performed by Diane L. Schoenherr, RN on 10/4/2023 at 1:48 PM.

## 2023-10-04 NOTE — PROGRESS NOTES
"  Assessment & Plan     Alcohol-induced acute pancreatitis without infection or necrosis  Symptoms are much improved/resolve. Abstaining from etoh. Recheck labs to ensure resolution  - CBC with platelets and differential; Future  - Comprehensive metabolic panel (BMP + Alb, Alk Phos, ALT, AST, Total. Bili, TP); Future  - Lipase; Future    Alcohol dependence with unspecified alcohol-induced disorder (H)  Has been sober since ER visit. Congratulated him on his success and steps thus far to stay cedric. Reviewed other options which could be helpful/supportive for him to stay in sobriety. He declines these for now as he feels he is doing well with current system.   Ct liver did show steatosis changes. Reviewed long-term liver health and discussed importance of abstinence from etoh/healthy diet (Mediterranean)/regular exercise/healthy body weight. Monitor liver labs yearly. Consider fibroscan based on fib-4 scoring.   - CBC with platelets and differential; Future  - Comprehensive metabolic panel (BMP + Alb, Alk Phos, ALT, AST, Total. Bili, TP); Future  - INR; Future  - GGT; Future    Screen for colon cancer  He is interested in colonoscopy  - Colonoscopy Screening  Referral; Future    Screening for HIV (human immunodeficiency virus)  - HIV Antigen Antibody Combo; Future    Need for hepatitis C screening test  - Hepatitis C Screen Reflex to HCV RNA Quant and Genotype; Future    Screening cholesterol level  - Lipid panel reflex to direct LDL Non-fasting; Future    Screening for diabetes mellitus  - Hemoglobin A1c; Future           MED REC REQUIRED  Post Medication Reconciliation Status:  Discharge medications reconciled, continue medications without change  BMI:   Estimated body mass index is 28.86 kg/m  as calculated from the following:    Height as of this encounter: 1.727 m (5' 8\").    Weight as of this encounter: 86.1 kg (189 lb 12.8 oz).   Weight management plan: Discussed healthy diet and exercise " guidelines    Patient Instructions   Start daily multivitamin    Consider other support options for staying alcohol free:  Referral to addiction specialist  Alcohol treatment programs  Therapy  Medications to reduce desire.     Consider follow-up in primary care for 3-6 month recheck   Schedule annual physical      Imelda Roy MD  [facility] RONALD Menard is a 48 year old, presenting for the following health issues:  Hospital F/U        10/4/2023    12:35 PM   Additional Questions   Roomed by Whit   Accompanied by Self         10/4/2023    12:35 PM   Patient Reported Additional Medications   Patient reports taking the following new medications None       HPI   Patient is new to me and has not been seen in primary care since 2020.       ER Follow-up Visit:    [facility] [place]  Date of Admission: 9/15/2023  Date of Discharge: 9/15/2023  Reason(s) for Admission: Pancreatitis     Was your hospitalization related to COVID-19? No   Problems taking medications regularly:  None  Medication changes since discharge: None  Problems adhering to non-medication therapy:  None    Summary of ER Visit:  CareEverywhere information obtained and reviewed  Diagnostic Tests/Treatments reviewed.  Follow up needed: recheck with primary care  Other Healthcare Providers Involved in Patient s Care:         None  Update since discharge: improved.                      Was seen in ER for etoh induced pancreatitis. Was treated as outpt with ibuprofen, etoh cessation, slow advance of diet.     Doing well since ER visit. Has not dranken since then.   Abd pain and back pain has fully resolved.   Has started AA and has been doing well. In daily meetings  Lost 10 lbs since he stopped drinking and feels much better.   Starting to exercise  .   No current urges to drink. Desire is gone. Feels changing his lifestyle overall to no include etoh in his daily patterns. Support is good- had been  "hiding his drinking from his wife. He is working on regaining her trust. Sibs and parents are aware of situation and also his best friend is aware    Had been drinking 6-9 drinks per week leading up to the visit. Starting in march had already reduced intake as prior had been drinking 5-7 drinks per day.     Tried therapy back in 2020 and worked for 3 months but felt didn't have a great connection with the therapist.   Denies hx of depression/anxiety but reports stress present. Has been changing jobs for various reasons and current job he enjoys but it is still stressfull.  Overall feels positive. .     Bm's are back to normal.   Appetite is good.   No nausea  No fever, chills  Some leg swelling initially after ER visit which has resolved.   Breathing is nl. No cp            Review of Systems   Constitutional, HEENT, cardiovascular, pulmonary, gi and gu systems are negative, except as otherwise noted.      Objective    /77 (BP Location: Right arm, Patient Position: Sitting, Cuff Size: Adult Large)   Pulse 52   Temp 98.9  F (37.2  C) (Oral)   Resp 15   Ht 1.727 m (5' 8\")   Wt 86.1 kg (189 lb 12.8 oz)   SpO2 98%   BMI 28.86 kg/m    Body mass index is 28.86 kg/m .  Physical Exam   GENERAL: healthy, alert and no distress  NECK: no adenopathy, no asymmetry, masses, or scars and thyroid normal to palpation  RESP: lungs clear to auscultation - no rales, rhonchi or wheezes  CV: regular rate and rhythm, normal S1 S2, no S3 or S4, no murmur, click or rub, no peripheral edema and peripheral pulses strong  ABDOMEN: soft, nontender, no hepatosplenomegaly, no masses and bowel sounds normal  MS: no gross musculoskeletal defects noted, no edema  PSYCH: mentation appears normal, affect normal/bright      1. Findings consistent with acute interstitial edematous pancreatitis. No evidence of necrosis, pseudocyst or necrotic collections.    2. No portal vein thrombus or aneurysms identified.      Please note that all CT " scans at this facility use dose modulation, iterative reconstruction, and/or weight-based dosing when appropriate to reduce radiation dose to as low as reasonably achievable.    Dictated by Balbir Cruz MD @ 9/15/2023 9:42:14 PM    (Electronically Signed)      Imaging Results - CT ABDOMEN PELVIS W (09/15/2023 8:06 PM CDT)  Narrative   09/15/2023 9:42 PM CDT   For Patients:  As a result of the 21st Century Cures Act, medical imaging exams and procedure reports are released immediately into your electronic medical record.  You may view this report before your referring provider.  If you have questions, please contact your health care provider.      INDICATION:  Abdominal pain.    TECHNIQUE:  Multiplanar CT examination of the abdomen and pelvis were acquired after the administration of 100 mL Omnipaque 350 intravenously.    COMPARISON:  Abdominal radiograph performed earlier the same day.    FINDINGS:  Lower chest: Dependent atelectasis. No focal consolidation. Linear bandlike opacifications of the lung bases likely due to subsegmental atelectasis and/or scarring. Normal heart size. No pleural effusions or pneumothorax. Mild gynecomastia.  Liver:  Diffuse hepatic steatosis.  Gallbladder/Biliary:  Normal. No biliary ductal dilitation.  Pancreas:  There is a moderate amount of peripancreatic edema and inflammatory changes. The pancreas remains homogeneous sling enhancing. No peripancreatic collections.  Spleen:  Normal.  Adrenal Glands:  Normal.  Kidneys:  Normal size and symmetrically enhancing.  No obstructive calculi or hydronephrosis.    Ureters: Unremarkable.  Bladder:  Unremarkable.  Bowel:  No obstruction or bowel wall thickening. The appendix is normal. No significant colonic diverticulosis.  Pelvic organs:  Unremarkable.  Peritoneum:  Small amount of free fluid tracking inferiorly from the pancreas, along the left Gerota`s fascia and the left paracolic gutter. No drainable fluid collections identified.  Vessels:  No  aneurysms identified  portal vein remains patent.  No significant atherosclerotic disease.  Lymph Nodes:  No lymphadenopathy.  Abdominal Wall/Soft Tissues:  Unremarkable.  Bones:  Unremarkable.

## 2023-10-04 NOTE — PATIENT INSTRUCTIONS
Start daily multivitamin    Consider other support options for staying alcohol free:  Referral to addiction specialist  Alcohol treatment programs  Therapy  Medications to reduce desire.     Consider follow-up in primary care for 3-6 month recheck   Schedule annual physical

## 2023-10-05 DIAGNOSIS — K85.20 ALCOHOL-INDUCED ACUTE PANCREATITIS WITHOUT INFECTION OR NECROSIS: ICD-10-CM

## 2023-10-05 DIAGNOSIS — F10.29 ALCOHOL DEPENDENCE WITH UNSPECIFIED ALCOHOL-INDUCED DISORDER (H): Primary | ICD-10-CM

## 2023-10-05 LAB
ALBUMIN SERPL BCG-MCNC: 4.6 G/DL (ref 3.5–5.2)
ALP SERPL-CCNC: 63 U/L (ref 40–129)
ALT SERPL W P-5'-P-CCNC: 32 U/L (ref 0–70)
ANION GAP SERPL CALCULATED.3IONS-SCNC: 12 MMOL/L (ref 7–15)
AST SERPL W P-5'-P-CCNC: 28 U/L (ref 0–45)
BILIRUB SERPL-MCNC: 0.6 MG/DL
BUN SERPL-MCNC: 14.3 MG/DL (ref 6–20)
CALCIUM SERPL-MCNC: 9.9 MG/DL (ref 8.6–10)
CHLORIDE SERPL-SCNC: 103 MMOL/L (ref 98–107)
CHOLEST SERPL-MCNC: 210 MG/DL
CREAT SERPL-MCNC: 1.06 MG/DL (ref 0.67–1.17)
DEPRECATED HCO3 PLAS-SCNC: 25 MMOL/L (ref 22–29)
EGFRCR SERPLBLD CKD-EPI 2021: 87 ML/MIN/1.73M2
GGT SERPL-CCNC: 83 U/L (ref 8–61)
GLUCOSE SERPL-MCNC: 88 MG/DL (ref 70–99)
HCV AB SERPL QL IA: NONREACTIVE
HDLC SERPL-MCNC: 53 MG/DL
HIV 1+2 AB+HIV1 P24 AG SERPL QL IA: NONREACTIVE
LDLC SERPL CALC-MCNC: 140 MG/DL
LIPASE SERPL-CCNC: 116 U/L (ref 13–60)
NONHDLC SERPL-MCNC: 157 MG/DL
POTASSIUM SERPL-SCNC: 4.5 MMOL/L (ref 3.4–5.3)
PROT SERPL-MCNC: 7.9 G/DL (ref 6.4–8.3)
SODIUM SERPL-SCNC: 140 MMOL/L (ref 135–145)
TRIGL SERPL-MCNC: 83 MG/DL

## 2023-10-05 NOTE — RESULT ENCOUNTER NOTE
Derian,    It was a pleasure to see you in the office recently.     -The lipase pancreas test is definitely improved but not yet back to the normal range.  We will want to continue to follow this level until it normalizes again.  -The traditional hepatic panel liver test more but the GGT liver test was mildly elevated.  The GGT test is one of the last liver test to normalize after quitting alcohol.  The blood count panel shows mild anemia (low hemoglobin) and elevated platelets.  These can be effects from alcohol and from the pancreatitis.  Lets plan to recheck these levels also to ensure they normalize.  - The diabetes A1c level was elevated in the prediabetic range.  This indicates your blood sugar/glucose level over the last 3 months has been mildly elevated in the prediabetic range.  This is likely in part from your high alcohol intake recently and hopefully we will see this number decline as you abstain from alcohol.  Lifestyle measures will help to lower your blood glucose levels and lower the risks of diabetes. These measures include regular exercise, weight loss/maintaining a healthy body weight, and moderating/decreasing carbohydrates (sugar, bread, pasta, rice, baked goods, potatoes, etc) in your diet. We will continue to monitor your blood glucose annually, but please be seen sooner  if you develop any new signs or symptoms of diabetes (increase thirst or urination, fatigue, blurry vision, unexplained weight loss).   -Your cholesterol panel looks fairly similar to past checks. The LDL (bad cholesterol) continues to be higher than ideal.  Please continue to work on lifestyle measures to improve your cholesterol and reduce your cardiovascular risk  -The HIV and hepatitis C screening test were negative.  - the kidney and electrolyte panel was normal.    Lets plan to recheck your lab work in 1 month.  Please schedule an lab only visit for this.    Please MyChart or call if you have any concerns or questions.    Sincerely,  Imelda Roy MD

## 2023-10-09 ENCOUNTER — MYC MEDICAL ADVICE (OUTPATIENT)
Dept: FAMILY MEDICINE | Facility: CLINIC | Age: 48
End: 2023-10-09
Payer: COMMERCIAL

## 2023-10-24 ENCOUNTER — TELEPHONE (OUTPATIENT)
Dept: GASTROENTEROLOGY | Facility: CLINIC | Age: 48
End: 2023-10-24
Payer: COMMERCIAL

## 2023-10-24 NOTE — TELEPHONE ENCOUNTER
"Endoscopy Scheduling Screen    Have you had a positive Covid test in the last 14 days?  No    Are you active on MyChart?   Yes    What insurance is in the chart?  Other:  BCBS    Ordering/Referring Provider: Kirill   (If ordering provider performs procedure, schedule with ordering provider unless otherwise instructed. )    BMI: Estimated body mass index is 28.86 kg/m  as calculated from the following:    Height as of 10/4/23: 1.727 m (5' 8\").    Weight as of 10/4/23: 86.1 kg (189 lb 12.8 oz).     Sedation Ordered  moderate sedation.   If patient BMI > 50 do not schedule in ASC.    If patient BMI > 45 do not schedule at [facility].    Are you taking methadone or Suboxone?  No    Are you taking any prescription medications for pain 3 or more times per week?   No    Do you have a history of malignant hyperthermia or adverse reaction to anesthesia?  No    (Females) Are you currently pregnant?   No     Have you been diagnosed or told you have pulmonary hypertension?   No    Do you have an LVAD?  No    Have you been told you have moderate to severe sleep apnea?  No    Have you been told you have COPD, asthma, or any other lung disease?  No    Do you have any heart conditions?  No     Have you ever had an organ transplant?   No    Have you ever had or are you awaiting a heart or lung transplant?   No    Have you had a stroke or transient ischemic attack (TIA aka \"mini stroke\" in the last 6 months?   No    Have you been diagnosed with or been told you have cirrhosis of the liver?   No    Are you currently on dialysis?   No    Do you need assistance transferring?   No    BMI: Estimated body mass index is 28.86 kg/m  as calculated from the following:    Height as of 10/4/23: 1.727 m (5' 8\").    Weight as of 10/4/23: 86.1 kg (189 lb 12.8 oz).     Is patients BMI > 40 and scheduling location [facility]?  No    Do you take an injectable medication for weight loss or diabetes (excluding insulin)?  No    Do you take the medication " Naltrexone?  No    Do you take blood thinners?  No       Prep   Are you currently on dialysis or do you have chronic kidney disease?  No    Do you have a diagnosis of diabetes?  No    Do you have a diagnosis of cystic fibrosis (CF)?  No    On a regular basis do you go 3 -5 days between bowel movements?  No    BMI > 40?  No    Preferred Pharmacy:    [facility]/pharmacy #[id number] - [place], [place] - [address]  [address]  [place] [address]  Phone: [phone] Fax: [phone]      Final Scheduling Details   Colonoscopy prep sent?  Standard MiraLAX    Procedure scheduled  Colonoscopy    Surgeon:  Fritz     Date of procedure:  1/5/24     Pre-OP / PAC:   No - Not required for this site.    Location  [facility] - Patient preference.    Sedation   Moderate Sedation - Per order.      Patient Reminders:   You will receive a call from a Nurse to review instructions and health history.  This assessment must be completed prior to your procedure.  Failure to complete the Nurse assessment may result in the procedure being cancelled.      On the day of your procedure, please designate an adult(s) who can drive you home stay with you for the next 24 hours. The medicines used in the exam will make you sleepy. You will not be able to drive.      You cannot take public transportation, ride share services, or non-medical taxi service without a responsible caregiver.  Medical transport services are allowed with the requirement that a responsible caregiver will receive you at your destination.  We require that drivers and caregivers are confirmed prior to your procedure.

## 2023-11-02 ENCOUNTER — LAB (OUTPATIENT)
Dept: LAB | Facility: CLINIC | Age: 48
End: 2023-11-02
Payer: COMMERCIAL

## 2023-11-02 DIAGNOSIS — F10.29 ALCOHOL DEPENDENCE WITH UNSPECIFIED ALCOHOL-INDUCED DISORDER (H): ICD-10-CM

## 2023-11-02 DIAGNOSIS — K85.20 ALCOHOL-INDUCED ACUTE PANCREATITIS WITHOUT INFECTION OR NECROSIS: ICD-10-CM

## 2023-11-02 DIAGNOSIS — F10.29 ALCOHOL DEPENDENCE WITH UNSPECIFIED ALCOHOL-INDUCED DISORDER (H): Primary | ICD-10-CM

## 2023-11-02 LAB
ALBUMIN SERPL BCG-MCNC: 4.5 G/DL (ref 3.5–5.2)
ALP SERPL-CCNC: 42 U/L (ref 40–129)
ALT SERPL W P-5'-P-CCNC: 16 U/L (ref 0–70)
ANION GAP SERPL CALCULATED.3IONS-SCNC: 10 MMOL/L (ref 7–15)
AST SERPL W P-5'-P-CCNC: 21 U/L (ref 0–45)
BASOPHILS # BLD AUTO: 0 10E3/UL (ref 0–0.2)
BASOPHILS NFR BLD AUTO: 0 %
BILIRUB SERPL-MCNC: 0.9 MG/DL
BUN SERPL-MCNC: 9.8 MG/DL (ref 6–20)
CALCIUM SERPL-MCNC: 9.9 MG/DL (ref 8.6–10)
CHLORIDE SERPL-SCNC: 108 MMOL/L (ref 98–107)
CREAT SERPL-MCNC: 1.13 MG/DL (ref 0.67–1.17)
DEPRECATED HCO3 PLAS-SCNC: 24 MMOL/L (ref 22–29)
EGFRCR SERPLBLD CKD-EPI 2021: 81 ML/MIN/1.73M2
EOSINOPHIL # BLD AUTO: 0.1 10E3/UL (ref 0–0.7)
EOSINOPHIL NFR BLD AUTO: 2 %
ERYTHROCYTE [DISTWIDTH] IN BLOOD BY AUTOMATED COUNT: 13 % (ref 10–15)
GGT SERPL-CCNC: 23 U/L (ref 8–61)
GLUCOSE SERPL-MCNC: 87 MG/DL (ref 70–99)
HCT VFR BLD AUTO: 39.9 % (ref 40–53)
HGB BLD-MCNC: 13 G/DL (ref 13.3–17.7)
IMM GRANULOCYTES # BLD: 0 10E3/UL
IMM GRANULOCYTES NFR BLD: 0 %
LIPASE SERPL-CCNC: 65 U/L (ref 13–60)
LYMPHOCYTES # BLD AUTO: 1.6 10E3/UL (ref 0.8–5.3)
LYMPHOCYTES NFR BLD AUTO: 32 %
MCH RBC QN AUTO: 29.3 PG (ref 26.5–33)
MCHC RBC AUTO-ENTMCNC: 32.6 G/DL (ref 31.5–36.5)
MCV RBC AUTO: 90 FL (ref 78–100)
MONOCYTES # BLD AUTO: 0.5 10E3/UL (ref 0–1.3)
MONOCYTES NFR BLD AUTO: 10 %
NEUTROPHILS # BLD AUTO: 2.8 10E3/UL (ref 1.6–8.3)
NEUTROPHILS NFR BLD AUTO: 55 %
PLATELET # BLD AUTO: 264 10E3/UL (ref 150–450)
POTASSIUM SERPL-SCNC: 4.5 MMOL/L (ref 3.4–5.3)
PROT SERPL-MCNC: 7.2 G/DL (ref 6.4–8.3)
RBC # BLD AUTO: 4.43 10E6/UL (ref 4.4–5.9)
SODIUM SERPL-SCNC: 142 MMOL/L (ref 135–145)
WBC # BLD AUTO: 5 10E3/UL (ref 4–11)

## 2023-11-02 PROCEDURE — 80053 COMPREHEN METABOLIC PANEL: CPT

## 2023-11-02 PROCEDURE — 36415 COLL VENOUS BLD VENIPUNCTURE: CPT

## 2023-11-02 PROCEDURE — 82977 ASSAY OF GGT: CPT

## 2023-11-02 PROCEDURE — 83690 ASSAY OF LIPASE: CPT

## 2023-11-02 PROCEDURE — 85025 COMPLETE CBC W/AUTO DIFF WBC: CPT

## 2023-11-02 NOTE — RESULT ENCOUNTER NOTE
Derian,  Thank you for coming in for the repeat labs.  Numbers are improving.  The GGT liver test has now dropped back down to the normal range.  The lipase pancreas test has also dropped significantly and almost back to the normal range.  Your blood count panel continues to show a mild anemia (likely from the previous alcohol effect) but this also looks to be improving from last check.  The platelet count has normalized.  Things are definitely moving in the right direction.  Lets continue to monitor labs again in 6 to 8 weeks to ensure numbers have normalized.  Certainly if you develop any new GI related concerns please check in with us.  Please MyChart or call if you have any concerns or questions.   Sincerely,  Imelda Roy MD

## 2023-12-06 ENCOUNTER — TELEPHONE (OUTPATIENT)
Dept: GASTROENTEROLOGY | Facility: CLINIC | Age: 48
End: 2023-12-06
Payer: COMMERCIAL

## 2023-12-06 NOTE — TELEPHONE ENCOUNTER
Caller: Derian    Reason for Reschedule/Cancellation (please be detailed, any staff messages or encounters to note?): Schedule Conflict - needs Friday only      Prior to reschedule please review:  Ordering Provider: GALINDO WALTER   Sedation per order: Moderate  Does patient have any ASC Exclusions, please identify?: no      Notes on Cancelled Procedure:  Procedure: Lower Endoscopy [Colonoscopy]   Date: 01/05/2024  Location: [facility]; [address] [place] [address]  Surgeon: MARGUERITE      Rescheduled: Yes  Procedure: Lower Endoscopy [Colonoscopy]   Date: 02/23/2024  Location: [facility]; [address] [place] [address]  Surgeon: PABLO  Sedation Level Scheduled  Moderate,  Reason for Sedation Level Per Order  Prep/Instructions updated and sent: YesAp

## 2024-01-03 ENCOUNTER — LAB (OUTPATIENT)
Dept: LAB | Facility: CLINIC | Age: 49
End: 2024-01-03
Payer: COMMERCIAL

## 2024-01-03 DIAGNOSIS — F10.29 ALCOHOL DEPENDENCE WITH UNSPECIFIED ALCOHOL-INDUCED DISORDER (H): ICD-10-CM

## 2024-01-03 LAB
ALBUMIN SERPL BCG-MCNC: 4.7 G/DL (ref 3.5–5.2)
ALP SERPL-CCNC: 42 U/L (ref 40–150)
ALT SERPL W P-5'-P-CCNC: 23 U/L (ref 0–70)
ANION GAP SERPL CALCULATED.3IONS-SCNC: 10 MMOL/L (ref 7–15)
AST SERPL W P-5'-P-CCNC: 27 U/L (ref 0–45)
BASOPHILS # BLD AUTO: 0 10E3/UL (ref 0–0.2)
BASOPHILS NFR BLD AUTO: 1 %
BILIRUB SERPL-MCNC: 0.4 MG/DL
BUN SERPL-MCNC: 13.3 MG/DL (ref 6–20)
CALCIUM SERPL-MCNC: 10 MG/DL (ref 8.6–10)
CHLORIDE SERPL-SCNC: 106 MMOL/L (ref 98–107)
CREAT SERPL-MCNC: 1.15 MG/DL (ref 0.67–1.17)
DEPRECATED HCO3 PLAS-SCNC: 25 MMOL/L (ref 22–29)
EGFRCR SERPLBLD CKD-EPI 2021: 79 ML/MIN/1.73M2
EOSINOPHIL # BLD AUTO: 0.1 10E3/UL (ref 0–0.7)
EOSINOPHIL NFR BLD AUTO: 2 %
ERYTHROCYTE [DISTWIDTH] IN BLOOD BY AUTOMATED COUNT: 13.1 % (ref 10–15)
GGT SERPL-CCNC: 19 U/L (ref 8–61)
GLUCOSE SERPL-MCNC: 105 MG/DL (ref 70–99)
HCT VFR BLD AUTO: 43.1 % (ref 40–53)
HGB BLD-MCNC: 14 G/DL (ref 13.3–17.7)
IMM GRANULOCYTES # BLD: 0 10E3/UL
IMM GRANULOCYTES NFR BLD: 0 %
LIPASE SERPL-CCNC: 46 U/L (ref 13–60)
LYMPHOCYTES # BLD AUTO: 1.9 10E3/UL (ref 0.8–5.3)
LYMPHOCYTES NFR BLD AUTO: 31 %
MCH RBC QN AUTO: 28.3 PG (ref 26.5–33)
MCHC RBC AUTO-ENTMCNC: 32.5 G/DL (ref 31.5–36.5)
MCV RBC AUTO: 87 FL (ref 78–100)
MONOCYTES # BLD AUTO: 0.5 10E3/UL (ref 0–1.3)
MONOCYTES NFR BLD AUTO: 9 %
NEUTROPHILS # BLD AUTO: 3.4 10E3/UL (ref 1.6–8.3)
NEUTROPHILS NFR BLD AUTO: 57 %
NRBC # BLD AUTO: 0 10E3/UL
NRBC BLD AUTO-RTO: 0 /100
PLATELET # BLD AUTO: 277 10E3/UL (ref 150–450)
POTASSIUM SERPL-SCNC: 4.6 MMOL/L (ref 3.4–5.3)
PROT SERPL-MCNC: 7.1 G/DL (ref 6.4–8.3)
RBC # BLD AUTO: 4.95 10E6/UL (ref 4.4–5.9)
SODIUM SERPL-SCNC: 141 MMOL/L (ref 135–145)
WBC # BLD AUTO: 6 10E3/UL (ref 4–11)

## 2024-01-03 PROCEDURE — 36415 COLL VENOUS BLD VENIPUNCTURE: CPT

## 2024-01-03 PROCEDURE — 83690 ASSAY OF LIPASE: CPT

## 2024-01-03 PROCEDURE — 80053 COMPREHEN METABOLIC PANEL: CPT

## 2024-01-03 PROCEDURE — 82977 ASSAY OF GGT: CPT

## 2024-01-03 PROCEDURE — 85025 COMPLETE CBC W/AUTO DIFF WBC: CPT

## 2024-01-04 NOTE — RESULT ENCOUNTER NOTE
William Menard,  I am happy to report your labs look great.  The kidney, liver and electrolyte panel was normal (glucose is fine provided you were not fasting for the blood work).   GGT liver test and lipase are now completely back in the normal range.  Your blood count panel has also rebounded and normalized.  Keep up the good work with abstaining from alcohol!  Kind regards,  Imelda Roy MD

## 2024-02-07 NOTE — TELEPHONE ENCOUNTER
"Endoscopy Scheduling Screen    Have you had a positive Covid test in the last 14 days?  No    Are you active on MyChart?   Yes    What insurance is in the chart?  Other:  BCBS    Ordering/Referring Provider: GALINDO WALTER    (If ordering provider performs procedure, schedule with ordering provider unless otherwise instructed. )    BMI: Estimated body mass index is 28.86 kg/m  as calculated from the following:    Height as of 10/4/23: 1.727 m (5' 8\").    Weight as of 10/4/23: 86.1 kg (189 lb 12.8 oz).     Sedation Ordered  moderate sedation.   If patient BMI > 50 do not schedule in [facility].    If patient BMI > 45 do not schedule at [facility].    Are you taking methadone or Suboxone?  No    Have you had difficulties, pain, or discomfort during past endoscopy procedures?  No    Are you taking any prescription medications for pain 3 or more times per week?   NO - No RN review required.    Do you have a history of malignant hyperthermia or adverse reaction to anesthesia?  No    Have you been diagnosed or told you have pulmonary hypertension?   No    Do you have an LVAD?  No    Have you been told you have moderate to severe sleep apnea?  No    Have you been told you have COPD, asthma, or any other lung disease?  No    Do you have any heart conditions?  No     Have you ever had an organ transplant?   No    Have you ever had or are you awaiting a heart or lung transplant?   No    Have you had a stroke or transient ischemic attack (TIA aka \"mini stroke\" in the last 6 months?   No    Have you been diagnosed with or been told you have cirrhosis of the liver?   No    Are you currently on dialysis?   No    Do you need assistance transferring?   No    BMI: Estimated body mass index is 28.86 kg/m  as calculated from the following:    Height as of 10/4/23: 1.727 m (5' 8\").    Weight as of 10/4/23: 86.1 kg (189 lb 12.8 oz).     Is patients BMI > 40 and scheduling location [facility]?  No    Do you take an injectable medication for " weight loss or diabetes (excluding insulin)?  No    Do you take the medication Naltrexone?  No    Do you take blood thinners?  No       Prep   Are you currently on dialysis or do you have chronic kidney disease?  No    Do you have a diagnosis of diabetes?  No    Do you have a diagnosis of cystic fibrosis (CF)?  No    On a regular basis do you go 3 -5 days between bowel movements?  No    BMI > 40?  No    Preferred Pharmacy:    [facility]/pharmacy #[id number] - [place], [place] - [address]  [address] [address]  Phone: [phone] Fax: [phone]    Caller: Derian    Reason for Reschedule/Cancellation (please be detailed, any staff messages or encounters to note?): Schedule Conflict      Prior to reschedule please review:  Ordering Provider: GALINDO WALTER   Sedation Determined: Moderate  Does patient have any ASC Exclusions, please identify?: No      Notes on Cancelled Procedure:  Procedure: Lower Endoscopy [Colonoscopy]   Date: 02/23/2024  Location: [facility]; [address] [place] [address]  Surgeon: PABLO      Rescheduled: Yes  Procedure: Lower Endoscopy [Colonoscopy]   Date: 05/10/2024  Location: [facility]; [address] [place] [address]  Surgeon: PABLO  Sedation Level Scheduled  Moderate,  Reason for Sedation Level Per Order  Prep/Instructions updated and sent: YesAp

## 2024-04-27 ENCOUNTER — TELEPHONE (OUTPATIENT)
Dept: GASTROENTEROLOGY | Facility: CLINIC | Age: 49
End: 2024-04-27
Payer: COMMERCIAL

## 2024-04-27 NOTE — TELEPHONE ENCOUNTER
Pre visit planning completed.      Procedure details:    Patient scheduled for Colonoscopy  on 5.10.2024.     Arrival time: 1200. Procedure time 1245    Facility location: [facility]; [facility] [place] [address]. Check in location: [facility].     Sedation type: Conscious sedation - hx of alcohol dependence.  Upon chart review the patient has been abstaining from alcohol since Fall 2023.    Pre op exam needed? N/A    Indication for procedure: screening colonsocopy      Chart review:     Electronic implanted devices? No    Recent diagnosis of diverticulitis within the last 6 weeks? No    Diabetic? No      Medication review:    Anticoagulants? No    NSAIDS? No NSAID medications per patient's medication list.  RN will verify with pre-assessment call.    Other medication HOLDING recommendations:  N/A      Prep for procedure:     Bowel prep recommendation: Standard Miralax  Due to: standard bowel prep.    Prep instructions sent via [employer]         Rissa Villanueva RN  Endoscopy Procedure Pre Assessment RN  [phone] option 4

## 2024-04-29 NOTE — TELEPHONE ENCOUNTER
Pre assessment completed for upcoming procedure.   (Please see previous telephone encounter notes for complete details)      Procedure details:    Arrival time and facility location reviewed.    Pre op exam needed? N/A    Designated  policy reviewed. Instructed to have someone stay 6 hours post procedure. Discussed conscious sedation with patient. Patient reports sobriety for 7 months now.       Medication review:    Medications reviewed. Please see supporting documentation below. Holding recommendations discussed (if applicable).   Ferrous Sulfate (iron supplement): HOLD 7 days before procedure. Iron in multi-vitamin per patient.       Prep for procedure:     Procedure prep instructions reviewed.        Any additional information needed:  N/A      Patient verbalized understanding and had no questions or concerns at this time.      Mary Gandhi RN  Endoscopy Procedure Pre Assessment RN  [phone] option 4

## 2024-05-10 ENCOUNTER — HOSPITAL ENCOUNTER (OUTPATIENT)
Facility: CLINIC | Age: 49
Discharge: HOME | End: 2024-05-10
Attending: STUDENT IN AN ORGANIZED HEALTH CARE EDUCATION/TRAINING PROGRAM | Admitting: STUDENT IN AN ORGANIZED HEALTH CARE EDUCATION/TRAINING PROGRAM
Payer: COMMERCIAL

## 2024-05-10 VITALS
SYSTOLIC BLOOD PRESSURE: 105 MMHG | DIASTOLIC BLOOD PRESSURE: 80 MMHG | WEIGHT: 175 LBS | OXYGEN SATURATION: 96 % | HEART RATE: 49 BPM | RESPIRATION RATE: 16 BRPM | HEIGHT: 68 IN | BODY MASS INDEX: 26.52 KG/M2

## 2024-05-10 LAB — COLONOSCOPY: NORMAL

## 2024-05-10 PROCEDURE — 250N000011 HC RX IP 250 OP 636: Performed by: STUDENT IN AN ORGANIZED HEALTH CARE EDUCATION/TRAINING PROGRAM

## 2024-05-10 PROCEDURE — G0500 MOD SEDAT ENDO SERVICE >5YRS: HCPCS | Performed by: STUDENT IN AN ORGANIZED HEALTH CARE EDUCATION/TRAINING PROGRAM

## 2024-05-10 PROCEDURE — 88305 TISSUE EXAM BY PATHOLOGIST: CPT | Mod: 26 | Performed by: PATHOLOGY

## 2024-05-10 PROCEDURE — 88305 TISSUE EXAM BY PATHOLOGIST: CPT | Mod: TC | Performed by: STUDENT IN AN ORGANIZED HEALTH CARE EDUCATION/TRAINING PROGRAM

## 2024-05-10 PROCEDURE — 45385 COLONOSCOPY W/LESION REMOVAL: CPT | Performed by: STUDENT IN AN ORGANIZED HEALTH CARE EDUCATION/TRAINING PROGRAM

## 2024-05-10 RX ORDER — NALOXONE HYDROCHLORIDE 0.4 MG/ML
0.4 INJECTION, SOLUTION INTRAMUSCULAR; INTRAVENOUS; SUBCUTANEOUS
Status: CANCELLED | OUTPATIENT
Start: 2024-05-10

## 2024-05-10 RX ORDER — PROCHLORPERAZINE MALEATE 10 MG
10 TABLET ORAL EVERY 6 HOURS PRN
Status: CANCELLED | OUTPATIENT
Start: 2024-05-10

## 2024-05-10 RX ORDER — NALOXONE HYDROCHLORIDE 0.4 MG/ML
0.2 INJECTION, SOLUTION INTRAMUSCULAR; INTRAVENOUS; SUBCUTANEOUS
Status: CANCELLED | OUTPATIENT
Start: 2024-05-10

## 2024-05-10 RX ORDER — LIDOCAINE 40 MG/G
CREAM TOPICAL
Status: CANCELLED | OUTPATIENT
Start: 2024-05-10

## 2024-05-10 RX ORDER — ONDANSETRON 2 MG/ML
4 INJECTION INTRAMUSCULAR; INTRAVENOUS
Status: CANCELLED | OUTPATIENT
Start: 2024-05-10

## 2024-05-10 RX ORDER — FENTANYL CITRATE 50 UG/ML
INJECTION, SOLUTION INTRAMUSCULAR; INTRAVENOUS PRN
Status: DISCONTINUED | OUTPATIENT
Start: 2024-05-10 | End: 2024-05-10 | Stop reason: HOSPADM

## 2024-05-10 RX ORDER — FLUMAZENIL 0.1 MG/ML
0.2 INJECTION, SOLUTION INTRAVENOUS
Status: CANCELLED | OUTPATIENT
Start: 2024-05-10 | End: 2024-05-10

## 2024-05-10 RX ORDER — ONDANSETRON 4 MG/1
4 TABLET, ORALLY DISINTEGRATING ORAL EVERY 6 HOURS PRN
Status: CANCELLED | OUTPATIENT
Start: 2024-05-10

## 2024-05-10 RX ORDER — ONDANSETRON 2 MG/ML
4 INJECTION INTRAMUSCULAR; INTRAVENOUS EVERY 6 HOURS PRN
Status: CANCELLED | OUTPATIENT
Start: 2024-05-10

## 2024-05-10 ASSESSMENT — ACTIVITIES OF DAILY LIVING (ADL)
ADLS_ACUITY_SCORE: 33
ADLS_ACUITY_SCORE: 35

## 2024-05-10 NOTE — H&P
Pre-Endoscopy History and Physical     Derian English MRN# [id number]   YOB: 1975 Age: 49 year old     Date of Procedure: 05/10/24  [facility]  Type of Endoscopy: Colonoscopy  Reason for Procedure: Screening  Type of Anesthesia Anticipated: Moderate Sedation    HPI:    Derian is a 49 year old male who will be undergoing the above procedure.      Prior colonoscopy: None    A history and physical has been performed. He does have history of alcohol induced pancreatitis, treated in Sept 2023. He has stopped drinking. The patient's medications and allergies have been reviewed. The risks and benefits of the procedure and the sedation options and risks were discussed with the patient.  All questions were answered and informed consent was obtained.      He denies a personal or family history of anesthesia complications or bleeding disorders.   denies a family history of CRC.  denies a family history of IBD.  denies changes in bowel habits except more regular since abstaining from EtOH.   denies blood in stool.     No Known Allergies   Allergy to Latex: NO   Intolerances: NO     No current facility-administered medications for this encounter.       Patient Active Problem List   Diagnosis    CARDIOVASCULAR SCREENING; LDL GOAL LESS THAN 160    Overweight (BMI 25.0-29.9)    Pruritus ani    Alcohol dependence (H)        Past Medical History:   Diagnosis Date    Closed fracture of neck of right radius 1991    NO ACTIVE PROBLEMS         Past Surgical History:   Procedure Laterality Date    VASECTOMY Bilateral 06/27/2018    Dr. Anton Randall       Social History     Tobacco Use    Smoking status: Never    Smokeless tobacco: Never   Substance Use Topics    Alcohol use: Not Currently     Alcohol/week: 20.0 standard drinks of alcohol     Types: 20 Standard drinks or equivalent per week     Comment: sober for 8 months       Family History   Problem Relation Age of Onset     "Other Cancer Mother 47        lung; lobectomy, no issue since.     Family History Negative Father     Coronary Artery Disease No family hx of     Diabetes No family hx of     Colon Cancer No family hx of     Prostate Cancer No family hx of        REVIEW OF SYSTEMS:     5 point ROS negative except as noted above in HPI, including Gen., Resp., CV, GI &  system review.      PHYSICAL EXAM:   /75   Pulse (!) 47   Ht 1.727 m (5' 8\")   Wt 79.4 kg (175 lb)   SpO2 97%   BMI 26.61 kg/m   Estimated body mass index is 26.61 kg/m  as calculated from the following:    Height as of this encounter: 1.727 m (5' 8\").    Weight as of this encounter: 79.4 kg (175 lb).   All Vitals have been reviewed.    GENERAL APPEARANCE: healthy and alert  MENTAL STATUS: alert  AIRWAY EXAM: Mallampatti Class I (visualization of the soft palate, fauces, uvula, anterior and posterior pillars)  RESP: lungs clear to auscultation - no rales, rhonchi or wheezes  CV: regular rates and rhythm      IMPRESSION   ASA Class 2 - Mild systemic disease        PLAN:     Plan for colonoscopy. We discussed the risks, benefits and alternatives and the patient wished to proceed.          Shweta Moreira MD  [facility]  Phone: [phone]  Fax: [phone]  May 10, 2024    "

## 2024-05-13 LAB
PATH REPORT.COMMENTS IMP SPEC: NORMAL
PATH REPORT.COMMENTS IMP SPEC: NORMAL
PATH REPORT.FINAL DX SPEC: NORMAL
PATH REPORT.GROSS SPEC: NORMAL
PATH REPORT.MICROSCOPIC SPEC OTHER STN: NORMAL
PATH REPORT.RELEVANT HX SPEC: NORMAL
PHOTO IMAGE: NORMAL

## 2024-05-16 ENCOUNTER — TRANSFERRED RECORDS (OUTPATIENT)
Dept: HEALTH INFORMATION MANAGEMENT | Facility: CLINIC | Age: 49
End: 2024-05-16
Payer: COMMERCIAL

## 2024-05-24 ENCOUNTER — PATIENT OUTREACH (OUTPATIENT)
Dept: GASTROENTEROLOGY | Facility: CLINIC | Age: 49
End: 2024-05-24
Payer: COMMERCIAL

## 2024-07-06 ENCOUNTER — HEALTH MAINTENANCE LETTER (OUTPATIENT)
Age: 49
End: 2024-07-06

## 2024-09-18 ENCOUNTER — OFFICE VISIT (OUTPATIENT)
Dept: FAMILY MEDICINE | Facility: CLINIC | Age: 49
End: 2024-09-18
Payer: COMMERCIAL

## 2024-09-18 ENCOUNTER — ORDERS ONLY (AUTO-RELEASED) (OUTPATIENT)
Dept: FAMILY MEDICINE | Facility: CLINIC | Age: 49
End: 2024-09-18

## 2024-09-18 VITALS
TEMPERATURE: 97.1 F | BODY MASS INDEX: 26.46 KG/M2 | DIASTOLIC BLOOD PRESSURE: 60 MMHG | SYSTOLIC BLOOD PRESSURE: 102 MMHG | WEIGHT: 174.6 LBS | HEART RATE: 47 BPM | OXYGEN SATURATION: 100 % | RESPIRATION RATE: 14 BRPM | HEIGHT: 68 IN

## 2024-09-18 DIAGNOSIS — R07.89 ATYPICAL CHEST PAIN: ICD-10-CM

## 2024-09-18 DIAGNOSIS — Z00.00 ENCOUNTER FOR ANNUAL PHYSICAL EXAM: ICD-10-CM

## 2024-09-18 DIAGNOSIS — Z13.220 LIPID SCREENING: Primary | ICD-10-CM

## 2024-09-18 DIAGNOSIS — R00.1 SINUS BRADYCARDIA: ICD-10-CM

## 2024-09-18 DIAGNOSIS — F10.21 ALCOHOL DEPENDENCE IN REMISSION (H): ICD-10-CM

## 2024-09-18 PROCEDURE — 99396 PREV VISIT EST AGE 40-64: CPT | Mod: 25 | Performed by: FAMILY MEDICINE

## 2024-09-18 PROCEDURE — 36415 COLL VENOUS BLD VENIPUNCTURE: CPT | Performed by: FAMILY MEDICINE

## 2024-09-18 PROCEDURE — 99213 OFFICE O/P EST LOW 20 MIN: CPT | Mod: 25 | Performed by: FAMILY MEDICINE

## 2024-09-18 PROCEDURE — 90656 IIV3 VACC NO PRSV 0.5 ML IM: CPT | Performed by: FAMILY MEDICINE

## 2024-09-18 PROCEDURE — 80061 LIPID PANEL: CPT | Performed by: FAMILY MEDICINE

## 2024-09-18 PROCEDURE — 90480 ADMN SARSCOV2 VAC 1/ONLY CMP: CPT | Performed by: FAMILY MEDICINE

## 2024-09-18 PROCEDURE — 80053 COMPREHEN METABOLIC PANEL: CPT | Performed by: FAMILY MEDICINE

## 2024-09-18 PROCEDURE — 90471 IMMUNIZATION ADMIN: CPT | Performed by: FAMILY MEDICINE

## 2024-09-18 PROCEDURE — 91320 SARSCV2 VAC 30MCG TRS-SUC IM: CPT | Performed by: FAMILY MEDICINE

## 2024-09-18 PROCEDURE — 93000 ELECTROCARDIOGRAM COMPLETE: CPT | Performed by: FAMILY MEDICINE

## 2024-09-18 ASSESSMENT — PAIN SCALES - GENERAL: PAINLEVEL: NO PAIN (0)

## 2024-09-18 NOTE — PROGRESS NOTES
"Preventive Care Visit  [facility] SHAWN Sandy MD, Family Medicine  Sep 18, 2024      Assessment & Plan     Lipid screening    - Lipid panel reflex to direct LDL Fasting; Future  - Comprehensive metabolic panel; Future    Encounter for annual physical exam    - Lipid panel reflex to direct LDL Fasting; Future  - Comprehensive metabolic panel; Future    Alcohol dependence in remission (H)      Atypical chest pain  Chest pain could be related to anxiety however he has some discomfort which is not exertional.  Due to family history I suggested to do a stress test as well as get a Zio patch for evaluation.  Discussed if any change in symptoms worsening symptoms please report back immediately.  - EKG 12-lead complete w/read - Clinics  - ZIO PATCH MAIL OUT; Future  - Exercise Stress Test - Adult; Future    Sinus bradycardia  Patient likes to run and that has been his routine however he was never told in the past he has low heart rate.  Will get it further evaluated with some detailed evaluation including Zio patch for 3 days to see if there is any other issues or any pauses.    Patient has been advised of split billing requirements and indicates understanding: Yes        BMI  Estimated body mass index is 26.77 kg/m  as calculated from the following:    Height as of this encounter: 1.72 m (5' 7.72\").    Weight as of this encounter: 79.2 kg (174 lb 9.6 oz).       Counseling  Appropriate preventive services were addressed with this patient via screening, questionnaire, or discussion as appropriate for fall prevention, nutrition, physical activity, Tobacco-use cessation, social engagement, weight loss and cognition.  Checklist reviewing preventive services available has been given to the patient.  Reviewed patient's diet, addressing concerns and/or questions.   Patient is at risk for social isolation and has been provided with information about the benefit of social connection.       MEDICATIONS:  " Continue current medications without change    Margo Menard is a 49 year old, presenting for the following:  Physical  Patient is a pleasant 49-year-old male overall healthy however he feels sometimes chest tightness he is not sure if he contributed that to some anxiety.  He likes to run and he is able to run without any significant chest pain but sometimes he has to take deep breath.  He has family history of coronary artery disease with his brother who was couple years older than him.  Patient also have history of alcoholism which he quit 1 year ago currently sober and doing [employer] meetings online daily.  He has significant history of alcoholism with pancreatitis.  Also feels slight increased anxiety due to work.        9/18/2024    12:41 PM   Additional Questions   Roomed by Hannah CHAPPELL        Health Care Directive  Patient does not have a Health Care Directive or Living Will: Discussed advance care planning with patient; however, patient declined at this time.    HPI        9/17/2024   General Health   How would you rate your overall physical health? Good   Feel stress (tense, anxious, or unable to sleep) Rather much      (!) STRESS CONCERN      9/17/2024   Nutrition   Three or more servings of calcium each day? (!) NO   Diet: Regular (no restrictions)   How many servings of fruit and vegetables per day? (!) 2-3   How many sweetened beverages each day? 0-1            9/17/2024   Exercise   Days per week of moderate/strenous exercise 4 days   Average minutes spent exercising at this level 50 min            9/17/2024   Social Factors   Frequency of gathering with friends or relatives Never   Worry food won't last until get money to buy more No   Food not last or not have enough money for food? No   Do you have housing? (Housing is defined as stable permanent housing and does not include staying ouside in a car, in a tent, in an abandoned building, in an overnight shelter, or couch-surfing.) Yes   Are you worried  about losing your housing? No   Lack of transportation? No   Unable to get utilities (heat,electricity)? No      (!) SOCIAL CONNECTIONS CONCERN      9/17/2024   Dental   Dentist two times every year? Yes            9/17/2024   TB Screening   Were you born outside of the [place]? No            Today's PHQ-2 Score:       9/17/2024     7:26 AM   PHQ-2 ( 1999 [employer])   Q1: Little interest or pleasure in doing things 0   Q2: Feeling down, depressed or hopeless 0   PHQ-2 Score 0   Q1: Little interest or pleasure in doing things Not at all   Q2: Feeling down, depressed or hopeless Not at all   PHQ-2 Score 0           9/17/2024   Substance Use   Alcohol more than 3/day or more than 7/wk No   Do you use any other substances recreationally? No        Social History     Tobacco Use    Smoking status: Never    Smokeless tobacco: Never   Vaping Use    Vaping status: Never Used    Passive vaping exposure: Yes   Substance Use Topics    Alcohol use: Not Currently     Alcohol/week: 20.0 standard drinks of alcohol     Types: 20 Standard drinks or equivalent per week     Comment: sober for 8 months    Drug use: No           9/17/2024   STI Screening   New sexual partner(s) since last STI/HIV test? No      ASCVD Risk   The 10-year ASCVD risk score (Mukesh DK, et al., 2019) is: 1.9%    Values used to calculate the score:      Age: 49 years      Sex: Male      Is Non- : No      Diabetic: No      Tobacco smoker: No      Systolic Blood Pressure: 102 mmHg      Is BP treated: No      HDL Cholesterol: 53 mg/dL      Total Cholesterol: 210 mg/dL       Reviewed and updated as needed this visit by Provider                    Past Medical History:   Diagnosis Date    Alcohol-induced acute pancreatitis 2024    Closed fracture of neck of right radius 01/01/1991    NO ACTIVE PROBLEMS      Past Surgical History:   Procedure Laterality Date    COLONOSCOPY N/A 5/10/2024    Procedure: Colonoscopy;  Surgeon: Shweta Moreira  "MD;  Location:  GI    VASECTOMY Bilateral 06/27/2018    Dr. Anton Randall         Review of Systems  CONSTITUTIONAL: NEGATIVE for fever, chills, change in weight  INTEGUMENTARY/SKIN: NEGATIVE for worrisome rashes, moles or lesions  EYES: NEGATIVE for vision changes or irritation  ENT/MOUTH: NEGATIVE for ear, mouth and throat problems  RESP: NEGATIVE for significant cough or SOB  BREAST: NEGATIVE for masses, tenderness or discharge  CV: NEGATIVE for chest pain, palpitations or peripheral edema  GI: NEGATIVE for nausea, abdominal pain, heartburn, or change in bowel habits  : NEGATIVE for frequency, dysuria, or hematuria  MUSCULOSKELETAL: NEGATIVE for significant arthralgias or myalgia  NEURO: NEGATIVE for weakness, dizziness or paresthesias  ENDOCRINE: NEGATIVE for temperature intolerance, skin/hair changes  HEME: NEGATIVE for bleeding problems  PSYCHIATRIC: positive for anxiety.     Objective    Exam  /60 (BP Location: Right arm, Patient Position: Sitting, Cuff Size: Adult Large)   Pulse (!) 47   Temp 97.1  F (36.2  C) (Tympanic)   Resp 14   Ht 1.72 m (5' 7.72\")   Wt 79.2 kg (174 lb 9.6 oz)   SpO2 100%   BMI 26.77 kg/m     Estimated body mass index is 26.77 kg/m  as calculated from the following:    Height as of this encounter: 1.72 m (5' 7.72\").    Weight as of this encounter: 79.2 kg (174 lb 9.6 oz).    Physical Exam  GENERAL: alert and no distress  EYES: Eyes grossly normal to inspection, PERRL and conjunctivae and sclerae normal  HENT: ear canals and TM's normal, nose and mouth without ulcers or lesions  NECK: no adenopathy, no asymmetry, masses, or scars  RESP: lungs clear to auscultation - no rales, rhonchi or wheezes  CV: Irregular heart rate.  Normal S1 S2, no S3 or S4, no murmur, click or rub, no peripheral edema  ABDOMEN: soft, nontender, no hepatosplenomegaly, no masses and bowel sounds normal  MS: no gross musculoskeletal defects noted, no edema  SKIN: no suspicious lesions or " selene  NEURO: Normal strength and tone, mentation intact and speech normal  PSYCH: Mood is anxious        Signed Electronically by: Jason Sandy MD

## 2024-09-19 LAB
ALBUMIN SERPL BCG-MCNC: 4.6 G/DL (ref 3.5–5.2)
ALP SERPL-CCNC: 42 U/L (ref 40–150)
ALT SERPL W P-5'-P-CCNC: 18 U/L (ref 0–70)
ANION GAP SERPL CALCULATED.3IONS-SCNC: 9 MMOL/L (ref 7–15)
AST SERPL W P-5'-P-CCNC: 25 U/L (ref 0–45)
BILIRUB SERPL-MCNC: 1.1 MG/DL
BUN SERPL-MCNC: 16 MG/DL (ref 6–20)
CALCIUM SERPL-MCNC: 9.3 MG/DL (ref 8.8–10.4)
CHLORIDE SERPL-SCNC: 105 MMOL/L (ref 98–107)
CHOLEST SERPL-MCNC: 198 MG/DL
CREAT SERPL-MCNC: 1.05 MG/DL (ref 0.67–1.17)
EGFRCR SERPLBLD CKD-EPI 2021: 88 ML/MIN/1.73M2
FASTING STATUS PATIENT QL REPORTED: YES
FASTING STATUS PATIENT QL REPORTED: YES
GLUCOSE SERPL-MCNC: 92 MG/DL (ref 70–99)
HCO3 SERPL-SCNC: 25 MMOL/L (ref 22–29)
HDLC SERPL-MCNC: 63 MG/DL
LDLC SERPL CALC-MCNC: 123 MG/DL
NONHDLC SERPL-MCNC: 135 MG/DL
POTASSIUM SERPL-SCNC: 4.3 MMOL/L (ref 3.4–5.3)
PROT SERPL-MCNC: 6.9 G/DL (ref 6.4–8.3)
SODIUM SERPL-SCNC: 139 MMOL/L (ref 135–145)
TRIGL SERPL-MCNC: 58 MG/DL

## 2024-09-27 PROCEDURE — 93244 EXT ECG>48HR<7D REV&INTERPJ: CPT | Performed by: INTERNAL MEDICINE

## 2024-10-21 ENCOUNTER — MYC MEDICAL ADVICE (OUTPATIENT)
Dept: FAMILY MEDICINE | Facility: CLINIC | Age: 49
End: 2024-10-21
Payer: COMMERCIAL

## 2024-10-21 DIAGNOSIS — R07.89 ATYPICAL CHEST PAIN: Primary | ICD-10-CM

## 2024-10-21 DIAGNOSIS — R00.1 SINUS BRADYCARDIA: ICD-10-CM

## 2024-10-21 NOTE — TELEPHONE ENCOUNTER
Dr. Sandy, please see pt's [facility] and advise. Pt last saw you 9/18 and bradycardia was discussed.    Pended referral for your review.    Nasreen Franklin RN

## 2024-10-28 ENCOUNTER — HOSPITAL ENCOUNTER (OUTPATIENT)
Dept: CARDIOLOGY | Facility: CLINIC | Age: 49
Discharge: HOME | End: 2024-10-28
Attending: FAMILY MEDICINE | Admitting: FAMILY MEDICINE
Payer: COMMERCIAL

## 2024-10-28 DIAGNOSIS — R07.89 ATYPICAL CHEST PAIN: ICD-10-CM

## 2024-10-28 PROCEDURE — 93017 CV STRESS TEST TRACING ONLY: CPT

## 2024-10-28 PROCEDURE — 93016 CV STRESS TEST SUPVJ ONLY: CPT | Performed by: INTERNAL MEDICINE

## 2024-10-28 PROCEDURE — 93018 CV STRESS TEST I&R ONLY: CPT | Performed by: INTERNAL MEDICINE

## 2024-11-04 ENCOUNTER — OFFICE VISIT (OUTPATIENT)
Dept: CARDIOLOGY | Facility: CLINIC | Age: 49
End: 2024-11-04
Attending: FAMILY MEDICINE
Payer: COMMERCIAL

## 2024-11-04 VITALS
HEIGHT: 68 IN | SYSTOLIC BLOOD PRESSURE: 116 MMHG | DIASTOLIC BLOOD PRESSURE: 72 MMHG | OXYGEN SATURATION: 99 % | HEART RATE: 48 BPM | BODY MASS INDEX: 26.83 KG/M2 | WEIGHT: 177 LBS

## 2024-11-04 DIAGNOSIS — R07.89 ATYPICAL CHEST PAIN: ICD-10-CM

## 2024-11-04 DIAGNOSIS — R00.1 SINUS BRADYCARDIA: ICD-10-CM

## 2024-11-04 PROCEDURE — 99203 OFFICE O/P NEW LOW 30 MIN: CPT | Performed by: INTERNAL MEDICINE

## 2024-11-04 NOTE — LETTER
11/4/2024    [place] [id number] [facility] [id number]    RE: Derian Carterkarie       Dear Colleague,     I had the pleasure of seeing Derian English in the [facility].    General Cardiology Clinic Progress Note  Derian English MRN# [id number]   YOB: 1975 Age: 49 year old       Reason for visit: Bradycardia    History of presenting illness:    I had the opportunity to see Derian English at [facility] today for evaluation of bradycardia.  He had problems with alcohol abuse until a year ago when he developed pancreatitis and decided to quit using alcohol entirely.  Last spring he started running and has been running for an hour 3-4 times a week since then.  He completes about 6 miles each time at about 9 minutes a mile.  During his exercise, he has no concerning symptoms of shortness of breath, chest discomfort, palpitations, lightheadedness, or syncope.  He was noted to have a slow heart rate during his primary care visit recently at 43 bpm.  His ECG showed sinus bradycardia but was otherwise normal.  He went on to have a stress ECG which was completely normal, with a peak heart rate of 160 bpm.  He also had a Zio patch monitor for 3 days which showed heart rates varying between 36 and 111 bpm and no significant arrhythmias.  He did not do his usual running during that time.    He has not had syncope.  He gets occasionally lightheaded when he stands up after bending over or kneeling down.  He has momentary palpitations from time to time.    He had evidence of prediabetes with a hemoglobin A1c of 6.0 a year ago when he had just stopped drinking.  His cholesterol numbers at that time were also high with an LDL of 140, but now, after a much healthier lifestyle, his LDL is down to 123 with an HDL of 63.  He does not have hypertension.  He has never smoked.  His brother had some type of heart episode at age 48 which may have been a heart  attack but also had an unhealthy lifestyle.  He has no other family history of premature cardiac disease.    On examination today his blood pressure is 116/72, heart rate 48, and weight 177 pounds.  His lungs are clear.  Heart rhythm is regular.  He has no cardiac murmurs or carotid bruits.              Assessment and Plan:     ASSESSMENT:    Mr. Derian English is a 49-year-old gentleman with bradycardia.  His Zio patch monitor shows only sinus rhythm and his stress ECG study was completely normal with an excellent exercise tolerance.  He was able to achieve a heart rate of 160 bpm during exercise.  In fact, he runs for an hour, completing about 6 miles at 9-minute miles 3-4 times a week without any adverse symptoms.  Based on this, I think it is highly unlikely that he has any significant underlying heart disease.  I suspect that his sinus bradycardia is due to endurance exercise.    I encouraged him to contact me to discuss his symptoms if he develops any concerning lightheadedness or syncopal issues.  Otherwise, I do not think any further evaluation is necessary at this time.    Mikael Benson MD           Orders this Visit:  No orders of the defined types were placed in this encounter.    No orders of the defined types were placed in this encounter.    There are no discontinued medications.    Today's clinic visit entailed:    40 minutes spent by me on the date of the encounter doing chart review, history and exam, documentation and further activities per the note  Provider  Link to [facility] Help Grid     The level of medical decision making during this visit was of high complexity.           Review of Systems:     Review of Systems:  Skin:        Eyes:       ENT:       Respiratory:  Negative    Cardiovascular:    lightheadedness;Positive for  Gastroenterology:      Genitourinary:       Musculoskeletal:       Neurologic:       Psychiatric:       Heme/Lymph/Imm:       Endocrine:  Negative thyroid disorder;diabetes    "         Physical Exam:     Vitals: /72   Pulse (!) 48   Ht 1.727 m (5' 8\")   Wt 80.3 kg (177 lb)   SpO2 99%   BMI 26.91 kg/m    Constitutional: Well nourished and in no apparent distress.  Eyes: Pupils equal, round. Sclerae anicteric.   HEENT: Normocephalic, atraumatic.   Neck: Supple. JVD   Respiratory: Breathing non-labored. Lungs clear to auscultation bilaterally. No crackles, wheezes, rhonchi, or rales.  Cardiovascular:  Regular rate and rhythm, normal S1 and S2. No murmur, rub, or gallop.  Skin: Warm, dry. No rashes, cyanosis, or xanthelasma.  Extremities: No edema.  Neurologic: No gross motor deficits. Alert, awake, and oriented to person, place and time.  Psychiatric: Affect appropriate.             Medications:     No current outpatient medications on file.       Family History   Problem Relation Age of Onset     Other Cancer Mother 47        lung; lobectomy, no issue since.      Family History Negative Father      Coronary Artery Disease No family hx of      Diabetes No family hx of      Colon Cancer No family hx of      Prostate Cancer No family hx of        Social History     Socioeconomic History     Marital status:      Spouse name:      Number of children: 2     Years of education: Not on file     Highest education level: Not on file   Occupational History     Occupation:    Tobacco Use     Smoking status: Never     Smokeless tobacco: Never   Vaping Use     Vaping status: Never Used     Passive vaping exposure: Yes   Substance and Sexual Activity     Alcohol use: Not Currently     Alcohol/week: 20.0 standard drinks of alcohol     Types: 20 Standard drinks or equivalent per week     Comment: sober for 14 months     Drug use: No     Sexual activity: Yes     Partners: Female     Birth control/protection: Condom   Other Topics Concern     Parent/sibling w/ CABG, MI or angioplasty before 65F 55M? Yes     Comment: Brother heart attack   Social History Narrative     Not on " file     Social Drivers of Health     Financial Resource Strain: Low Risk  (9/17/2024)    Financial Resource Strain      Within the past 12 months, have you or your family members you live with been unable to get utilities (heat, electricity) when it was really needed?: No   Food Insecurity: Low Risk  (9/17/2024)    Food Insecurity      Within the past 12 months, did you worry that your food would run out before you got money to buy more?: No      Within the past 12 months, did the food you bought just not last and you didn t have money to get more?: No   Transportation Needs: Low Risk  (9/17/2024)    Transportation Needs      Within the past 12 months, has lack of transportation kept you from medical appointments, getting your medicines, non-medical meetings or appointments, work, or from getting things that you need?: No   Physical Activity: Sufficiently Active (9/17/2024)    Exercise Vital Sign      Days of Exercise per Week: 4 days      Minutes of Exercise per Session: 50 min   Stress: Stress Concern Present (9/17/2024)    [employer] - Occupational Stress Questionnaire      Feeling of Stress : Rather much   Social Connections: Unknown (9/17/2024)    Social Connection and Isolation Panel [NHANES]      Frequency of Communication with Friends and Family: Not on file      Frequency of Social Gatherings with Friends and Family: Never      Attends Congregational Services: Not on file      Active Member of Clubs or Organizations: Not on file      Attends Club or Organization Meetings: Not on file      Marital Status: Not on file   Interpersonal Safety: Low Risk  (9/18/2024)    Interpersonal Safety      Do you feel physically and emotionally safe where you currently live?: Yes      Within the past 12 months, have you been hit, slapped, kicked or otherwise physically hurt by someone?: No      Within the past 12 months, have you been humiliated or emotionally abused in other ways by your partner  or ex-partner?: No   Housing Stability: Low Risk  (9/17/2024)    Housing Stability      Do you have housing? : Yes      Are you worried about losing your housing?: No            Past Medical History:     Past Medical History:   Diagnosis Date     Alcohol-induced acute pancreatitis 2024     Closed fracture of neck of right radius 01/01/1991     NO ACTIVE PROBLEMS               Past Surgical History:     Past Surgical History:   Procedure Laterality Date     COLONOSCOPY N/A 5/10/2024    Procedure: Colonoscopy;  Surgeon: Shweta Moriera MD;  Location: [facility]     VASECTOMY Bilateral 06/27/2018    Dr. Anton Randall              Allergies:   Patient has no known allergies.       Data:   All laboratory data reviewed:    Recent Labs   Lab Test 09/18/24  1330 10/04/23  1405 02/25/20  0927   * 140* 147*   HDL 63 53 75   NHDL 135* 157* 171*   CHOL 198 210* 246*   TRIG 58 83 118       Lab Results   Component Value Date    WBC 6.0 01/03/2024    RBC 4.95 01/03/2024    HGB 14.0 01/03/2024    HCT 43.1 01/03/2024    MCV 87 01/03/2024    MCH 28.3 01/03/2024    MCHC 32.5 01/03/2024    RDW 13.1 01/03/2024     01/03/2024       Lab Results   Component Value Date     09/18/2024     02/25/2020    POTASSIUM 4.3 09/18/2024    POTASSIUM 4.1 02/25/2020    CHLORIDE 105 09/18/2024    CHLORIDE 107 02/25/2020    CO2 25 09/18/2024    CO2 24 02/25/2020    ANIONGAP 9 09/18/2024    ANIONGAP 5 02/25/2020    GLC 92 09/18/2024     (H) 02/25/2020    BUN 16.0 09/18/2024    BUN 22 02/25/2020    CR 1.05 09/18/2024    CR 1.08 02/25/2020    GFRESTIMATED 88 09/18/2024    GFRESTIMATED 83 02/25/2020    GFRESTBLACK >90 02/25/2020    KEILA 9.3 09/18/2024    KEILA 8.9 02/25/2020      Lab Results   Component Value Date    AST 25 09/18/2024    AST 30 02/25/2020    ALT 18 09/18/2024    ALT 40 02/25/2020       Lab Results   Component Value Date    A1C 6.0 (H) 10/04/2023       Lab Results   Component Value Date    INR 1.10 10/04/2023          ARRON PULIDO MD  [facility]    Thank you for allowing me to participate in the care of your patient.      Sincerely,     ARRON PULIDO MD     [facility]  cc:   Jason Sandy MD  [address] [place] [address] [id number]

## 2024-11-04 NOTE — PATIENT INSTRUCTIONS
It was a pleasure seeing you today and thank you for allowing me to be a part of your health care team.  Should you have any questions regarding your visit or future needs please feel free to reach out to my care team for assistance.      Thank you, Dr. Mikael Benson        **Nursing: [phone]       **Scheduling: [phone]

## 2024-11-04 NOTE — PROGRESS NOTES
General Cardiology Clinic Progress Note  Derian English MRN# [id number]   YOB: 1975 Age: 49 year old       Reason for visit: Bradycardia    History of presenting illness:    I had the opportunity to see Derian English at [facility] today for evaluation of bradycardia.  He had problems with alcohol abuse until a year ago when he developed pancreatitis and decided to quit using alcohol entirely.  Last spring he started running and has been running for an hour 3-4 times a week since then.  He completes about 6 miles each time at about 9 minutes a mile.  During his exercise, he has no concerning symptoms of shortness of breath, chest discomfort, palpitations, lightheadedness, or syncope.  He was noted to have a slow heart rate during his primary care visit recently at 43 bpm.  His ECG showed sinus bradycardia but was otherwise normal.  He went on to have a stress ECG which was completely normal, with a peak heart rate of 160 bpm.  He also had a Zio patch monitor for 3 days which showed heart rates varying between 36 and 111 bpm and no significant arrhythmias.  He did not do his usual running during that time.    He has not had syncope.  He gets occasionally lightheaded when he stands up after bending over or kneeling down.  He has momentary palpitations from time to time.    He had evidence of prediabetes with a hemoglobin A1c of 6.0 a year ago when he had just stopped drinking.  His cholesterol numbers at that time were also high with an LDL of 140, but now, after a much healthier lifestyle, his LDL is down to 123 with an HDL of 63.  He does not have hypertension.  He has never smoked.  His brother had some type of heart episode at age 48 which may have been a heart attack but also had an unhealthy lifestyle.  He has no other family history of premature cardiac disease.    On examination today his blood pressure is 116/72, heart rate 48, and weight 177 pounds.  His lungs are clear.  Heart  "rhythm is regular.  He has no cardiac murmurs or carotid bruits.              Assessment and Plan:     ASSESSMENT:    Mr. Derian English is a 49-year-old gentleman with bradycardia.  His Zio patch monitor shows only sinus rhythm and his stress ECG study was completely normal with an excellent exercise tolerance.  He was able to achieve a heart rate of 160 bpm during exercise.  In fact, he runs for an hour, completing about 6 miles at 9-minute miles 3-4 times a week without any adverse symptoms.  Based on this, I think it is highly unlikely that he has any significant underlying heart disease.  I suspect that his sinus bradycardia is due to endurance exercise.    I encouraged him to contact me to discuss his symptoms if he develops any concerning lightheadedness or syncopal issues.  Otherwise, I do not think any further evaluation is necessary at this time.    Mikael Benson MD           Orders this Visit:  No orders of the defined types were placed in this encounter.    No orders of the defined types were placed in this encounter.    There are no discontinued medications.    Today's clinic visit entailed:    40 minutes spent by me on the date of the encounter doing chart review, history and exam, documentation and further activities per the note  Provider  Link to [facility] Help Grid     The level of medical decision making during this visit was of high complexity.           Review of Systems:     Review of Systems:  Skin:        Eyes:       ENT:       Respiratory:  Negative    Cardiovascular:    lightheadedness;Positive for  Gastroenterology:      Genitourinary:       Musculoskeletal:       Neurologic:       Psychiatric:       Heme/Lymph/Imm:       Endocrine:  Negative thyroid disorder;diabetes            Physical Exam:     Vitals: /72   Pulse (!) 48   Ht 1.727 m (5' 8\")   Wt 80.3 kg (177 lb)   SpO2 99%   BMI 26.91 kg/m    Constitutional: Well nourished and in no apparent distress.  Eyes: Pupils equal, round. " Sclerae anicteric.   HEENT: Normocephalic, atraumatic.   Neck: Supple. JVD   Respiratory: Breathing non-labored. Lungs clear to auscultation bilaterally. No crackles, wheezes, rhonchi, or rales.  Cardiovascular:  Regular rate and rhythm, normal S1 and S2. No murmur, rub, or gallop.  Skin: Warm, dry. No rashes, cyanosis, or xanthelasma.  Extremities: No edema.  Neurologic: No gross motor deficits. Alert, awake, and oriented to person, place and time.  Psychiatric: Affect appropriate.             Medications:     No current outpatient medications on file.       Family History   Problem Relation Age of Onset    Other Cancer Mother 47        lung; lobectomy, no issue since.     Family History Negative Father     Coronary Artery Disease No family hx of     Diabetes No family hx of     Colon Cancer No family hx of     Prostate Cancer No family hx of        Social History     Socioeconomic History    Marital status:      Spouse name:     Number of children: 2    Years of education: Not on file    Highest education level: Not on file   Occupational History    Occupation:    Tobacco Use    Smoking status: Never    Smokeless tobacco: Never   Vaping Use    Vaping status: Never Used    Passive vaping exposure: Yes   Substance and Sexual Activity    Alcohol use: Not Currently     Alcohol/week: 20.0 standard drinks of alcohol     Types: 20 Standard drinks or equivalent per week     Comment: sober for 14 months    Drug use: No    Sexual activity: Yes     Partners: Female     Birth control/protection: Condom   Other Topics Concern    Parent/sibling w/ CABG, MI or angioplasty before 65F 55M? Yes     Comment: Brother heart attack   Social History Narrative    Not on file     Social Drivers of Health     Financial Resource Strain: Low Risk  (9/17/2024)    Financial Resource Strain     Within the past 12 months, have you or your family members you live with been unable to get utilities (heat, electricity) when it  was really needed?: No   Food Insecurity: Low Risk  (9/17/2024)    Food Insecurity     Within the past 12 months, did you worry that your food would run out before you got money to buy more?: No     Within the past 12 months, did the food you bought just not last and you didn t have money to get more?: No   Transportation Needs: Low Risk  (9/17/2024)    Transportation Needs     Within the past 12 months, has lack of transportation kept you from medical appointments, getting your medicines, non-medical meetings or appointments, work, or from getting things that you need?: No   Physical Activity: Sufficiently Active (9/17/2024)    Exercise Vital Sign     Days of Exercise per Week: 4 days     Minutes of Exercise per Session: 50 min   Stress: Stress Concern Present (9/17/2024)    [employer] - Occupational Stress Questionnaire     Feeling of Stress : Rather much   Social Connections: Unknown (9/17/2024)    Social Connection and Isolation Panel [NHANES]     Frequency of Communication with Friends and Family: Not on file     Frequency of Social Gatherings with Friends and Family: Never     Attends Mu-ism Services: Not on file     Active Member of Clubs or Organizations: Not on file     Attends Club or Organization Meetings: Not on file     Marital Status: Not on file   Interpersonal Safety: Low Risk  (9/18/2024)    Interpersonal Safety     Do you feel physically and emotionally safe where you currently live?: Yes     Within the past 12 months, have you been hit, slapped, kicked or otherwise physically hurt by someone?: No     Within the past 12 months, have you been humiliated or emotionally abused in other ways by your partner or ex-partner?: No   Housing Stability: Low Risk  (9/17/2024)    Housing Stability     Do you have housing? : Yes     Are you worried about losing your housing?: No            Past Medical History:     Past Medical History:   Diagnosis Date    Alcohol-induced acute  pancreatitis 2024    Closed fracture of neck of right radius 01/01/1991    NO ACTIVE PROBLEMS               Past Surgical History:     Past Surgical History:   Procedure Laterality Date    COLONOSCOPY N/A 5/10/2024    Procedure: Colonoscopy;  Surgeon: Shweta Moreira MD;  Location: [facility]    VASECTOMY Bilateral 06/27/2018    Dr. Anton Randall              Allergies:   Patient has no known allergies.       Data:   All laboratory data reviewed:    Recent Labs   Lab Test 09/18/24  1330 10/04/23  1405 02/25/20  0927   * 140* 147*   HDL 63 53 75   NHDL 135* 157* 171*   CHOL 198 210* 246*   TRIG 58 83 118       Lab Results   Component Value Date    WBC 6.0 01/03/2024    RBC 4.95 01/03/2024    HGB 14.0 01/03/2024    HCT 43.1 01/03/2024    MCV 87 01/03/2024    MCH 28.3 01/03/2024    MCHC 32.5 01/03/2024    RDW 13.1 01/03/2024     01/03/2024       Lab Results   Component Value Date     09/18/2024     02/25/2020    POTASSIUM 4.3 09/18/2024    POTASSIUM 4.1 02/25/2020    CHLORIDE 105 09/18/2024    CHLORIDE 107 02/25/2020    CO2 25 09/18/2024    CO2 24 02/25/2020    ANIONGAP 9 09/18/2024    ANIONGAP 5 02/25/2020    GLC 92 09/18/2024     (H) 02/25/2020    BUN 16.0 09/18/2024    BUN 22 02/25/2020    CR 1.05 09/18/2024    CR 1.08 02/25/2020    GFRESTIMATED 88 09/18/2024    GFRESTIMATED 83 02/25/2020    GFRESTBLACK >90 02/25/2020    KEILA 9.3 09/18/2024    KEILA 8.9 02/25/2020      Lab Results   Component Value Date    AST 25 09/18/2024    AST 30 02/25/2020    ALT 18 09/18/2024    ALT 40 02/25/2020       Lab Results   Component Value Date    A1C 6.0 (H) 10/04/2023       Lab Results   Component Value Date    INR 1.10 10/04/2023         ARRON PULIDO MD  [facility] [place] [facility]

## 2025-08-19 ENCOUNTER — PATIENT OUTREACH (OUTPATIENT)
Dept: CARE COORDINATION | Facility: CLINIC | Age: 50
End: 2025-08-19
Payer: COMMERCIAL

## (undated) RX ORDER — FENTANYL CITRATE 50 UG/ML
INJECTION, SOLUTION INTRAMUSCULAR; INTRAVENOUS
Status: DISPENSED
Start: 2024-05-10